# Patient Record
Sex: MALE | Race: BLACK OR AFRICAN AMERICAN | NOT HISPANIC OR LATINO | URBAN - METROPOLITAN AREA
[De-identification: names, ages, dates, MRNs, and addresses within clinical notes are randomized per-mention and may not be internally consistent; named-entity substitution may affect disease eponyms.]

---

## 2024-07-15 ENCOUNTER — INPATIENT (INPATIENT)
Facility: HOSPITAL | Age: 89
LOS: 3 days | Discharge: HOME | DRG: 379 | End: 2024-07-19
Attending: STUDENT IN AN ORGANIZED HEALTH CARE EDUCATION/TRAINING PROGRAM | Admitting: STUDENT IN AN ORGANIZED HEALTH CARE EDUCATION/TRAINING PROGRAM
Payer: MEDICARE

## 2024-07-15 VITALS
SYSTOLIC BLOOD PRESSURE: 111 MMHG | RESPIRATION RATE: 18 BRPM | HEART RATE: 68 BPM | OXYGEN SATURATION: 99 % | WEIGHT: 167.99 LBS | DIASTOLIC BLOOD PRESSURE: 63 MMHG

## 2024-07-15 DIAGNOSIS — K62.5 HEMORRHAGE OF ANUS AND RECTUM: ICD-10-CM

## 2024-07-15 LAB
ALBUMIN SERPL ELPH-MCNC: 3.7 G/DL — SIGNIFICANT CHANGE UP (ref 3.5–5.2)
ALP SERPL-CCNC: 77 U/L — SIGNIFICANT CHANGE UP (ref 30–115)
ALT FLD-CCNC: 10 U/L — SIGNIFICANT CHANGE UP (ref 0–41)
ANION GAP SERPL CALC-SCNC: 16 MMOL/L — HIGH (ref 7–14)
APTT BLD: 41 SEC — HIGH (ref 27–39.2)
APTT BLD: 46 SEC — HIGH (ref 27–39.2)
AST SERPL-CCNC: 18 U/L — SIGNIFICANT CHANGE UP (ref 0–41)
BASE EXCESS BLDV CALC-SCNC: -0.5 MMOL/L — SIGNIFICANT CHANGE UP (ref -2–3)
BASOPHILS # BLD AUTO: 0.03 K/UL — SIGNIFICANT CHANGE UP (ref 0–0.2)
BASOPHILS NFR BLD AUTO: 0.5 % — SIGNIFICANT CHANGE UP (ref 0–1)
BILIRUB SERPL-MCNC: 0.5 MG/DL — SIGNIFICANT CHANGE UP (ref 0.2–1.2)
BLD GP AB SCN SERPL QL: SIGNIFICANT CHANGE UP
BUN SERPL-MCNC: 34 MG/DL — HIGH (ref 10–20)
CA-I SERPL-SCNC: 1.27 MMOL/L — SIGNIFICANT CHANGE UP (ref 1.15–1.33)
CALCIUM SERPL-MCNC: 9.3 MG/DL — SIGNIFICANT CHANGE UP (ref 8.4–10.5)
CHLORIDE SERPL-SCNC: 107 MMOL/L — SIGNIFICANT CHANGE UP (ref 98–110)
CO2 SERPL-SCNC: 20 MMOL/L — SIGNIFICANT CHANGE UP (ref 17–32)
CREAT SERPL-MCNC: 2 MG/DL — HIGH (ref 0.7–1.5)
EGFR: 32 ML/MIN/1.73M2 — LOW
EOSINOPHIL # BLD AUTO: 0.07 K/UL — SIGNIFICANT CHANGE UP (ref 0–0.7)
EOSINOPHIL NFR BLD AUTO: 1.1 % — SIGNIFICANT CHANGE UP (ref 0–8)
FLUAV AG NPH QL: SIGNIFICANT CHANGE UP
FLUBV AG NPH QL: SIGNIFICANT CHANGE UP
GAS PNL BLDV: 141 MMOL/L — SIGNIFICANT CHANGE UP (ref 136–145)
GAS PNL BLDV: SIGNIFICANT CHANGE UP
GLUCOSE SERPL-MCNC: 110 MG/DL — HIGH (ref 70–99)
HCO3 BLDV-SCNC: 25 MMOL/L — SIGNIFICANT CHANGE UP (ref 22–29)
HCT VFR BLD CALC: 28 % — LOW (ref 42–52)
HCT VFR BLD CALC: 29.9 % — LOW (ref 42–52)
HCT VFR BLDA CALC: 35 % — LOW (ref 39–51)
HGB BLD CALC-MCNC: 11.8 G/DL — LOW (ref 12.6–17.4)
HGB BLD-MCNC: 10 G/DL — LOW (ref 14–18)
HGB BLD-MCNC: 9.3 G/DL — LOW (ref 14–18)
IMM GRANULOCYTES NFR BLD AUTO: 0.8 % — HIGH (ref 0.1–0.3)
INR BLD: 1.98 RATIO — HIGH (ref 0.65–1.3)
INR BLD: 2.11 RATIO — HIGH (ref 0.65–1.3)
LACTATE BLDV-MCNC: 1.8 MMOL/L — SIGNIFICANT CHANGE UP (ref 0.5–2)
LACTATE SERPL-SCNC: 1.7 MMOL/L — SIGNIFICANT CHANGE UP (ref 0.7–2)
LYMPHOCYTES # BLD AUTO: 2.15 K/UL — SIGNIFICANT CHANGE UP (ref 1.2–3.4)
LYMPHOCYTES # BLD AUTO: 33.3 % — SIGNIFICANT CHANGE UP (ref 20.5–51.1)
MCHC RBC-ENTMCNC: 27.7 PG — SIGNIFICANT CHANGE UP (ref 27–31)
MCHC RBC-ENTMCNC: 27.8 PG — SIGNIFICANT CHANGE UP (ref 27–31)
MCHC RBC-ENTMCNC: 33.2 G/DL — SIGNIFICANT CHANGE UP (ref 32–37)
MCHC RBC-ENTMCNC: 33.4 G/DL — SIGNIFICANT CHANGE UP (ref 32–37)
MCV RBC AUTO: 82.8 FL — SIGNIFICANT CHANGE UP (ref 80–94)
MCV RBC AUTO: 83.6 FL — SIGNIFICANT CHANGE UP (ref 80–94)
MONOCYTES # BLD AUTO: 0.49 K/UL — SIGNIFICANT CHANGE UP (ref 0.1–0.6)
MONOCYTES NFR BLD AUTO: 7.6 % — SIGNIFICANT CHANGE UP (ref 1.7–9.3)
NEUTROPHILS # BLD AUTO: 3.66 K/UL — SIGNIFICANT CHANGE UP (ref 1.4–6.5)
NEUTROPHILS NFR BLD AUTO: 56.7 % — SIGNIFICANT CHANGE UP (ref 42.2–75.2)
NRBC # BLD: 0 /100 WBCS — SIGNIFICANT CHANGE UP (ref 0–0)
NRBC # BLD: 0 /100 WBCS — SIGNIFICANT CHANGE UP (ref 0–0)
PCO2 BLDV: 43 MMHG — SIGNIFICANT CHANGE UP (ref 42–55)
PH BLDV: 7.37 — SIGNIFICANT CHANGE UP (ref 7.32–7.43)
PLATELET # BLD AUTO: 177 K/UL — SIGNIFICANT CHANGE UP (ref 130–400)
PLATELET # BLD AUTO: 229 K/UL — SIGNIFICANT CHANGE UP (ref 130–400)
PMV BLD: 10.6 FL — HIGH (ref 7.4–10.4)
PMV BLD: 10.7 FL — HIGH (ref 7.4–10.4)
PO2 BLDV: 12 MMHG — LOW (ref 25–45)
POTASSIUM BLDV-SCNC: 5.3 MMOL/L — HIGH (ref 3.5–5.1)
POTASSIUM SERPL-MCNC: 5.2 MMOL/L — HIGH (ref 3.5–5)
POTASSIUM SERPL-SCNC: 5.2 MMOL/L — HIGH (ref 3.5–5)
PROT SERPL-MCNC: 7.2 G/DL — SIGNIFICANT CHANGE UP (ref 6–8)
PROTHROM AB SERPL-ACNC: 22.7 SEC — HIGH (ref 9.95–12.87)
PROTHROM AB SERPL-ACNC: 24.2 SEC — HIGH (ref 9.95–12.87)
RBC # BLD: 3.35 M/UL — LOW (ref 4.7–6.1)
RBC # BLD: 3.61 M/UL — LOW (ref 4.7–6.1)
RBC # FLD: 16.5 % — HIGH (ref 11.5–14.5)
RBC # FLD: 16.9 % — HIGH (ref 11.5–14.5)
RSV RNA NPH QL NAA+NON-PROBE: SIGNIFICANT CHANGE UP
SAO2 % BLDV: 11.9 % — LOW (ref 67–88)
SARS-COV-2 RNA SPEC QL NAA+PROBE: SIGNIFICANT CHANGE UP
SODIUM SERPL-SCNC: 143 MMOL/L — SIGNIFICANT CHANGE UP (ref 135–146)
WBC # BLD: 6.45 K/UL — SIGNIFICANT CHANGE UP (ref 4.8–10.8)
WBC # BLD: 6.81 K/UL — SIGNIFICANT CHANGE UP (ref 4.8–10.8)
WBC # FLD AUTO: 6.45 K/UL — SIGNIFICANT CHANGE UP (ref 4.8–10.8)
WBC # FLD AUTO: 6.81 K/UL — SIGNIFICANT CHANGE UP (ref 4.8–10.8)

## 2024-07-15 PROCEDURE — 99285 EMERGENCY DEPT VISIT HI MDM: CPT

## 2024-07-15 PROCEDURE — 93010 ELECTROCARDIOGRAM REPORT: CPT

## 2024-07-15 PROCEDURE — 85025 COMPLETE CBC W/AUTO DIFF WBC: CPT

## 2024-07-15 PROCEDURE — 80053 COMPREHEN METABOLIC PANEL: CPT

## 2024-07-15 PROCEDURE — 71045 X-RAY EXAM CHEST 1 VIEW: CPT | Mod: 26

## 2024-07-15 PROCEDURE — 83735 ASSAY OF MAGNESIUM: CPT

## 2024-07-15 PROCEDURE — 70450 CT HEAD/BRAIN W/O DYE: CPT | Mod: 26,MC

## 2024-07-15 PROCEDURE — 84145 PROCALCITONIN (PCT): CPT

## 2024-07-15 PROCEDURE — 86901 BLOOD TYPING SEROLOGIC RH(D): CPT

## 2024-07-15 PROCEDURE — 36415 COLL VENOUS BLD VENIPUNCTURE: CPT

## 2024-07-15 PROCEDURE — 85027 COMPLETE CBC AUTOMATED: CPT

## 2024-07-15 PROCEDURE — 97162 PT EVAL MOD COMPLEX 30 MIN: CPT | Mod: GP

## 2024-07-15 PROCEDURE — 84466 ASSAY OF TRANSFERRIN: CPT

## 2024-07-15 PROCEDURE — 83540 ASSAY OF IRON: CPT

## 2024-07-15 PROCEDURE — 74174 CTA ABD&PLVS W/CONTRAST: CPT | Mod: 26,MC

## 2024-07-15 PROCEDURE — 70450 CT HEAD/BRAIN W/O DYE: CPT | Mod: MC

## 2024-07-15 PROCEDURE — 0241U: CPT

## 2024-07-15 PROCEDURE — 86900 BLOOD TYPING SEROLOGIC ABO: CPT

## 2024-07-15 PROCEDURE — 95819 EEG AWAKE AND ASLEEP: CPT

## 2024-07-15 PROCEDURE — 86850 RBC ANTIBODY SCREEN: CPT

## 2024-07-15 PROCEDURE — 80048 BASIC METABOLIC PNL TOTAL CA: CPT

## 2024-07-15 RX ORDER — DEXTROSE MONOHYDRATE AND SODIUM CHLORIDE 5; .3 G/100ML; G/100ML
1000 INJECTION, SOLUTION INTRAVENOUS ONCE
Refills: 0 | Status: COMPLETED | OUTPATIENT
Start: 2024-07-15 | End: 2024-07-15

## 2024-07-15 RX ORDER — CEFEPIME 1 G/1
2000 INJECTION, POWDER, FOR SOLUTION INTRAMUSCULAR; INTRAVENOUS EVERY 8 HOURS
Refills: 0 | Status: DISCONTINUED | OUTPATIENT
Start: 2024-07-16 | End: 2024-07-16

## 2024-07-15 RX ORDER — PANTOPRAZOLE SODIUM 40 MG/10ML
40 INJECTION, POWDER, FOR SOLUTION INTRAVENOUS ONCE
Refills: 0 | Status: COMPLETED | OUTPATIENT
Start: 2024-07-15 | End: 2024-07-15

## 2024-07-15 RX ORDER — CEFEPIME 1 G/1
1000 INJECTION, POWDER, FOR SOLUTION INTRAMUSCULAR; INTRAVENOUS ONCE
Refills: 0 | Status: COMPLETED | OUTPATIENT
Start: 2024-07-15 | End: 2024-07-15

## 2024-07-15 RX ORDER — PANTOPRAZOLE SODIUM 40 MG/10ML
40 INJECTION, POWDER, FOR SOLUTION INTRAVENOUS
Refills: 0 | Status: DISCONTINUED | OUTPATIENT
Start: 2024-07-15 | End: 2024-07-18

## 2024-07-15 RX ORDER — DEXTROSE MONOHYDRATE AND SODIUM CHLORIDE 5; .3 G/100ML; G/100ML
1000 INJECTION, SOLUTION INTRAVENOUS
Refills: 0 | Status: DISCONTINUED | OUTPATIENT
Start: 2024-07-15 | End: 2024-07-17

## 2024-07-15 RX ADMIN — PANTOPRAZOLE SODIUM 40 MILLIGRAM(S): 40 INJECTION, POWDER, FOR SOLUTION INTRAVENOUS at 20:58

## 2024-07-15 RX ADMIN — CEFEPIME 100 MILLIGRAM(S): 1 INJECTION, POWDER, FOR SOLUTION INTRAMUSCULAR; INTRAVENOUS at 22:38

## 2024-07-15 RX ADMIN — DEXTROSE MONOHYDRATE AND SODIUM CHLORIDE 1000 MILLILITER(S): 5; .3 INJECTION, SOLUTION INTRAVENOUS at 17:43

## 2024-07-15 NOTE — ED PROVIDER NOTE - PHYSICAL EXAMINATION
VITAL SIGNS: I have reviewed nursing notes and confirm.  CONSTITUTIONAL: Non-toxic, in NAD  SKIN: Cool and dry, normal skin turgor  HEAD: NCAT  EYES: No conjunctival injection, scleral anicteric  ENT: Moist mucous membranes, normal pharynx with no erythema or exudates  NECK: Supple; full ROM. Nontender. No cervical LAD  CARD: RRR, no murmurs, rubs or gallops  RESP: Clear to ausculation bilaterally.  No rales, rhonchi, or wheezing.  ABD: Soft, non-distended, non-tender   EXT: Full ROM, no bony tenderness, no pedal edema, no calf tenderness  NEURO: Normal motor, normal sensory.   PSYCH: Cooperative, appropriate.

## 2024-07-15 NOTE — ED ADULT NURSE NOTE - NSFALLHARMRISKINTERV_ED_ALL_ED
Assistance OOB with selected safe patient handling equipment if applicable/Assistance with ambulation/Communicate risk of Fall with Harm to all staff, patient, and family/Monitor gait and stability/Provide visual cue: red socks, yellow wristband, yellow gown, etc/Reinforce activity limits and safety measures with patient and family/Bed in lowest position, wheels locked, appropriate side rails in place/Call bell, personal items and telephone in reach/Instruct patient to call for assistance before getting out of bed/chair/stretcher/Non-slip footwear applied when patient is off stretcher/[place] to call system/Physically safe environment - no spills, clutter or unnecessary equipment/Purposeful Proactive Rounding/Room/bathroom lighting operational, light cord in reach

## 2024-07-15 NOTE — ED PROVIDER NOTE - CARE PLAN
1 Principal Discharge DX:	Hypotension  Secondary Diagnosis:	Rectal bleed   Principal Discharge DX:	Rectal bleeding   Principal Discharge DX:	Rectal bleeding  Secondary Diagnosis:	EDUARDO (acute kidney injury)  Secondary Diagnosis:	Opacity of lung on imaging study  Secondary Diagnosis:	Hilar mass

## 2024-07-15 NOTE — ED ADULT NURSE NOTE - ISOLATION TYPE:
Pt was taking 50 mg morning and night. She was feeling much better on the increased meds. When on one daily she cries more and is much more angry. They will try 100mg at night. Daughter will call if this does not work. Patient has become much more forgetful. Can be verbally abusive to daughter and . Ed daughter on behavioral interventions for dementia. They have people in the house every night. None

## 2024-07-15 NOTE — ED ADULT TRIAGE NOTE - CHIEF COMPLAINT QUOTE
Pt c/o hypotension from [facility], systolic 80s as per EMS at NH. Pt at baseline mental status as per EMS (aox1). VSS in triage. Unable to get temperature in triage.

## 2024-07-15 NOTE — H&P ADULT - HISTORY OF PRESENT ILLNESS
Patient is an 89 year old male with PMH of AMS, afib on eliquis +amiodarone MM, Prostate CA, HTN, and recent admission in [place] for an ulcer presenting for hypotension, Patient was BIBEMS from [facility] for reports of hypotension and lethargy/AMS. Patient denies reports of chest pain, shortness of breath, nausea, vomiting, abdominal pain, diarrhea, constipation, or additional complaints.     In ED, vitals were stable. Found to have BRBPR on ROMULO. CT A/P w/ fndings concerning for pna, CTH w/ acute pathology.     GI was consulted. Recommended NPO w. ppi BID. Patient received IV cefepime/levaquin, LR bolus x1 and protonix IV 40mg x1 in ED. He is being admitted for LGIB and pna.

## 2024-07-15 NOTE — ED PROVIDER NOTE - PROGRESS NOTE DETAILS
SO: Daughter presented bedside and states patient is new to [facility] as of last week. He was recently discharged from [facility] for an ulcer. Denied any reports of rectal bleeding, fevers, or falls since patient arrived to nursing home. FF: according to carmen HORN pt creatinine on 7/11/2025 as 1.6 FF: according to carmen HORN pt creatinine on 7/11/2025 as 1.6. gi consulted recommend ppi iv bid, npo, and they will follow.

## 2024-07-15 NOTE — ED ADULT NURSE NOTE - NSFALLDEVICES_ED_ALL_ED
Hypertension is improving with treatment.  Continue current treatment regimen.  Blood pressure will be reassessed in 3 months.   None

## 2024-07-15 NOTE — H&P ADULT - ASSESSMENT
Patient is an 89 year old male with PMH of AMS, afib on eliquis +amiodarone MM, Prostate CA, HTN, and recent admission in [place] for an ulcer presenting for hypotension, He is being admitted for management of LGIB w/ pna.     Patient is an 89 year old male with PMH of AMS, afib on eliquis +amiodarone MM, Prostate CA, HTN, and recent admission in [place] for an ulcer presenting for hypotension, He is being admitted for management of LGIB w/ pna.    #LGIB  hematochezia, BRBPR noted on ROMULO  no hemmorhoids/fissures noted on exam, like diverticular etiology vs. Ca?  - Hb  currently 10, but can lag, vitally stable  - t&s resulted  - cbc q12 for now, cmp/mg, iron studies  - keep NPO, supp O2 prn, 2+PIV 18G  - will IV hydrate LR  - GI recs appr: NPO, PPI 40mg  BID, will await word on whether they will scope, colo vs sigmo (will need bowel prep if yes)  - GI following    #PNA?  #Proctitis  small right basilar pleural effusion (parapneumonic?) w/ consolidative opacities noted on CT AP  also w/ proctitis (may be 2/2 acute LGIB)  s/p cefepime/levaquin in ED  - vitally stable, but will cover ppx for now  - given recent NH admission, and hx of recent hospitalizations, kin c/w cefepime for now  - f/u RVP, sputum cx/gram stain, procal, urine strep/legionella, MRSA swab, procal  - tylenol prn    #afib on Eliquis 5mg bid + amiodarone 400mg qd  - will hold Eliquis pending GI recs. no need for reversal at this time  - c/w amiodarone once cleared for PO    #MM  #Prostate Cancer  - pt follows w/ Dr. Marie  - CT AP: L5 vertebral body sclerotic lesion concerning for metastatic disease.  - o/p f/u    #Dementia  #Hx of CVA  - AAOx1 at baseline  - per NH reports, patient takes:  mirtazapine 15mg half a tab qhs  depakote 125mg 2 capsules q8  keppra 750mg qd  - hold pending GI recs  - restart when able    #BPH  - flomax 0.4mg qhs      # Misc  - GI ppx: protonix BID  - DVT ppx: can hold for now given most recent coag levels, f/u w/ GI for plan and restart accordingly  - Diet: NPO  - Activity: bedrest  - DISPO:  admit    CODE STATUS:  FULL CODE (Discussed w/ daughter at bedside

## 2024-07-15 NOTE — ED ADULT NURSE NOTE - OBJECTIVE STATEMENT
c/o hypotension from [facility], systolic 80s as per EMS at NH. Pt at baseline mental status as per EMS (aox1). VSS in triage. Unable to get temperature in triage.

## 2024-07-15 NOTE — ED PROVIDER NOTE - CLINICAL SUMMARY MEDICAL DECISION MAKING FREE TEXT BOX
Patient presents with episode of hypotension at the nursing home.  Labs and imaging.  Found to have rectal bleeding in the ED.  CT angio added.  Also found to have pneumonia.  Antibiotics given.  GI consulted.  Patient admitted for further management.  Hemodynamically stable in the ED. Labs  were ordered and reviewed.  Imaging was ordered and reviewed by me.  Appropriate medications for patient's presenting complaints were ordered and effects were reassessed.  Patient's records (prior hospital, ED visit, and/or nursing home notes if available) were reviewed.  Additional history was obtained from EMS, family, and/or PCP (where available).  Escalation to admission/observation was considered.  Patient requires inpatient hospitalization - monitored setting.

## 2024-07-15 NOTE — ED PROVIDER NOTE - OBJECTIVE STATEMENT
Patient is an 89 year old male with PMH of MM, Prostate CA, HTN, and recent admission in [place] for an ulcer presenting for hypotension, Patient was BIBEMS from [facility] for reports of hypotension and lethargy/AMS. Patient denies reports of chest pain, shortness of breath, nausea, vomiting, abdominal pain, diarrhea, constipation, or additional complaints.

## 2024-07-15 NOTE — ED ADULT NURSE NOTE - CHIEF COMPLAINT QUOTE
Pt c/o hypotension from [facility], systolic 80s as per EMS at NH. Pt at baseline mental status as per EMS (aox1). VSS in triage. Unable to get temperature in triage.

## 2024-07-15 NOTE — ED PROVIDER NOTE - ATTENDING CONTRIBUTION TO CARE
89-year-old male past med history hypertension, CVA, hyperlipidemia, A-fib on Eliquis, BPH, dementia presents with episode of hypotension at nursing home.  Patient is a poor historian unable to state why he is here.  A&O x 1 at this time.  Hemodynamically stable in the ED.  As per nursing home paperwork reports episode of hypotension to the 80s at the facility.  Patient is not febrile in the ED.    CONSTITUTIONAL: Well-developed; well-nourished; in no acute distress.   SKIN: warm, dry  HEAD: Normocephalic; atraumatic.  EYES: PERRL, EOMI, no conjunctival erythema  ENT: No nasal discharge; airway clear.  NECK: Supple; non tender.  CARD: S1, S2 normal;  Regular rate and rhythm.   RESP: No wheezes, rales or rhonchi.  ABD: soft non tender, non distended, no rebound or guarding. + red blood on rectal exam.   EXT: Normal ROM.  5/5 strength in all 4 extremities   LYMPH: No acute cervical adenopathy.  NEURO:  neurovascularly intact  PSYCH: Cooperative, appropriate.

## 2024-07-16 LAB
ALBUMIN SERPL ELPH-MCNC: 3.2 G/DL — LOW (ref 3.5–5.2)
ALP SERPL-CCNC: 70 U/L — SIGNIFICANT CHANGE UP (ref 30–115)
ALT FLD-CCNC: 9 U/L — SIGNIFICANT CHANGE UP (ref 0–41)
ANION GAP SERPL CALC-SCNC: 13 MMOL/L — SIGNIFICANT CHANGE UP (ref 7–14)
AST SERPL-CCNC: 14 U/L — SIGNIFICANT CHANGE UP (ref 0–41)
BILIRUB SERPL-MCNC: 0.5 MG/DL — SIGNIFICANT CHANGE UP (ref 0.2–1.2)
BUN SERPL-MCNC: 29 MG/DL — HIGH (ref 10–20)
CALCIUM SERPL-MCNC: 8.7 MG/DL — SIGNIFICANT CHANGE UP (ref 8.4–10.5)
CHLORIDE SERPL-SCNC: 108 MMOL/L — SIGNIFICANT CHANGE UP (ref 98–110)
CO2 SERPL-SCNC: 25 MMOL/L — SIGNIFICANT CHANGE UP (ref 17–32)
CREAT SERPL-MCNC: 1.8 MG/DL — HIGH (ref 0.7–1.5)
EGFR: 36 ML/MIN/1.73M2 — LOW
GLUCOSE SERPL-MCNC: 109 MG/DL — HIGH (ref 70–99)
HCT VFR BLD CALC: 27 % — LOW (ref 42–52)
HCT VFR BLD CALC: 29.5 % — LOW (ref 42–52)
HGB BLD-MCNC: 8.9 G/DL — LOW (ref 14–18)
HGB BLD-MCNC: 9.7 G/DL — LOW (ref 14–18)
IRON SATN MFR SERPL: 69 UG/DL — SIGNIFICANT CHANGE UP (ref 35–150)
MAGNESIUM SERPL-MCNC: 2.8 MG/DL — HIGH (ref 1.8–2.4)
MCHC RBC-ENTMCNC: 27.1 PG — SIGNIFICANT CHANGE UP (ref 27–31)
MCHC RBC-ENTMCNC: 27.2 PG — SIGNIFICANT CHANGE UP (ref 27–31)
MCHC RBC-ENTMCNC: 32.9 G/DL — SIGNIFICANT CHANGE UP (ref 32–37)
MCHC RBC-ENTMCNC: 33 G/DL — SIGNIFICANT CHANGE UP (ref 32–37)
MCV RBC AUTO: 82.3 FL — SIGNIFICANT CHANGE UP (ref 80–94)
MCV RBC AUTO: 82.9 FL — SIGNIFICANT CHANGE UP (ref 80–94)
NRBC # BLD: 0 /100 WBCS — SIGNIFICANT CHANGE UP (ref 0–0)
NRBC # BLD: 0 /100 WBCS — SIGNIFICANT CHANGE UP (ref 0–0)
PLATELET # BLD AUTO: 176 K/UL — SIGNIFICANT CHANGE UP (ref 130–400)
PLATELET # BLD AUTO: 217 K/UL — SIGNIFICANT CHANGE UP (ref 130–400)
PMV BLD: 10.5 FL — HIGH (ref 7.4–10.4)
PMV BLD: 10.9 FL — HIGH (ref 7.4–10.4)
POTASSIUM SERPL-MCNC: 4.5 MMOL/L — SIGNIFICANT CHANGE UP (ref 3.5–5)
POTASSIUM SERPL-SCNC: 4.5 MMOL/L — SIGNIFICANT CHANGE UP (ref 3.5–5)
PROCALCITONIN SERPL-MCNC: 0.3 NG/ML — HIGH (ref 0.02–0.1)
PROT SERPL-MCNC: 6.4 G/DL — SIGNIFICANT CHANGE UP (ref 6–8)
RBC # BLD: 3.28 M/UL — LOW (ref 4.7–6.1)
RBC # BLD: 3.56 M/UL — LOW (ref 4.7–6.1)
RBC # FLD: 16.6 % — HIGH (ref 11.5–14.5)
RBC # FLD: 16.8 % — HIGH (ref 11.5–14.5)
SODIUM SERPL-SCNC: 146 MMOL/L — SIGNIFICANT CHANGE UP (ref 135–146)
TRANSFERRIN SERPL-MCNC: 121 MG/DL — LOW (ref 200–360)
WBC # BLD: 5.6 K/UL — SIGNIFICANT CHANGE UP (ref 4.8–10.8)
WBC # BLD: 6.51 K/UL — SIGNIFICANT CHANGE UP (ref 4.8–10.8)
WBC # FLD AUTO: 5.6 K/UL — SIGNIFICANT CHANGE UP (ref 4.8–10.8)
WBC # FLD AUTO: 6.51 K/UL — SIGNIFICANT CHANGE UP (ref 4.8–10.8)

## 2024-07-16 PROCEDURE — 99222 1ST HOSP IP/OBS MODERATE 55: CPT

## 2024-07-16 PROCEDURE — 70450 CT HEAD/BRAIN W/O DYE: CPT | Mod: 26

## 2024-07-16 PROCEDURE — 99223 1ST HOSP IP/OBS HIGH 75: CPT

## 2024-07-16 RX ORDER — AMIODARONE HYDROCHLORIDE 50 MG/ML
1 INJECTION, SOLUTION INTRAVENOUS
Refills: 0 | DISCHARGE

## 2024-07-16 RX ORDER — LEVETIRACETAM 100 MG/ML
750 INJECTION INTRAVENOUS ONCE
Refills: 0 | Status: DISCONTINUED | OUTPATIENT
Start: 2024-07-16 | End: 2024-07-16

## 2024-07-16 RX ORDER — TAMSULOSIN HYDROCHLORIDE 0.4 MG/1
0.4 CAPSULE ORAL AT BEDTIME
Refills: 0 | Status: DISCONTINUED | OUTPATIENT
Start: 2024-07-16 | End: 2024-07-19

## 2024-07-16 RX ORDER — APIXABAN 5 MG/1
2.5 TABLET, FILM COATED ORAL EVERY 12 HOURS
Refills: 0 | Status: DISCONTINUED | OUTPATIENT
Start: 2024-07-16 | End: 2024-07-16

## 2024-07-16 RX ORDER — MESALAMINE 500 MG/1
1000 CAPSULE ORAL AT BEDTIME
Refills: 0 | Status: DISCONTINUED | OUTPATIENT
Start: 2024-07-16 | End: 2024-07-19

## 2024-07-16 RX ORDER — MIRTAZAPINE 15 MG/1
7.5 TABLET, FILM COATED ORAL AT BEDTIME
Refills: 0 | Status: DISCONTINUED | OUTPATIENT
Start: 2024-07-16 | End: 2024-07-19

## 2024-07-16 RX ORDER — VALPROIC ACID 250 MG/5ML
250 SOLUTION ORAL THREE TIMES A DAY
Refills: 0 | Status: DISCONTINUED | OUTPATIENT
Start: 2024-07-16 | End: 2024-07-17

## 2024-07-16 RX ORDER — TAMSULOSIN HYDROCHLORIDE 0.4 MG/1
1 CAPSULE ORAL
Refills: 0 | DISCHARGE

## 2024-07-16 RX ORDER — DIVALPROEX SODIUM 500 MG
250 TABLET, DELAYED RELEASE (ENTERIC COATED) ORAL EVERY 8 HOURS
Refills: 0 | Status: DISCONTINUED | OUTPATIENT
Start: 2024-07-16 | End: 2024-07-16

## 2024-07-16 RX ORDER — CALCITRIOL 0.25 UG/1
1 CAPSULE, LIQUID FILLED ORAL
Refills: 0 | DISCHARGE

## 2024-07-16 RX ORDER — CALCITRIOL 0.25 UG/1
0.25 CAPSULE, LIQUID FILLED ORAL DAILY
Refills: 0 | Status: DISCONTINUED | OUTPATIENT
Start: 2024-07-16 | End: 2024-07-19

## 2024-07-16 RX ORDER — CEFEPIME 1 G/1
2000 INJECTION, POWDER, FOR SOLUTION INTRAMUSCULAR; INTRAVENOUS EVERY 24 HOURS
Refills: 0 | Status: DISCONTINUED | OUTPATIENT
Start: 2024-07-16 | End: 2024-07-16

## 2024-07-16 RX ORDER — AMIODARONE HYDROCHLORIDE 50 MG/ML
400 INJECTION, SOLUTION INTRAVENOUS DAILY
Refills: 0 | Status: DISCONTINUED | OUTPATIENT
Start: 2024-07-16 | End: 2024-07-19

## 2024-07-16 RX ORDER — APIXABAN 5 MG/1
1 TABLET, FILM COATED ORAL
Refills: 0 | DISCHARGE

## 2024-07-16 RX ORDER — FOLIC ACID
1 POWDER (GRAM) MISCELLANEOUS DAILY
Refills: 0 | Status: DISCONTINUED | OUTPATIENT
Start: 2024-07-16 | End: 2024-07-19

## 2024-07-16 RX ORDER — APIXABAN 5 MG/1
2.5 TABLET, FILM COATED ORAL EVERY 12 HOURS
Refills: 0 | Status: DISCONTINUED | OUTPATIENT
Start: 2024-07-17 | End: 2024-07-19

## 2024-07-16 RX ORDER — FOLIC ACID
1 POWDER (GRAM) MISCELLANEOUS
Refills: 0 | DISCHARGE

## 2024-07-16 RX ORDER — MIRTAZAPINE 15 MG/1
1 TABLET, FILM COATED ORAL
Refills: 0 | DISCHARGE

## 2024-07-16 RX ORDER — LEVETIRACETAM 100 MG/ML
750 INJECTION INTRAVENOUS DAILY
Refills: 0 | Status: DISCONTINUED | OUTPATIENT
Start: 2024-07-16 | End: 2024-07-17

## 2024-07-16 RX ORDER — LEVETIRACETAM 100 MG/ML
1 INJECTION INTRAVENOUS
Refills: 0 | DISCHARGE

## 2024-07-16 RX ORDER — DIVALPROEX SODIUM 500 MG
1 TABLET, DELAYED RELEASE (ENTERIC COATED) ORAL
Refills: 0 | DISCHARGE

## 2024-07-16 RX ADMIN — PANTOPRAZOLE SODIUM 40 MILLIGRAM(S): 40 INJECTION, POWDER, FOR SOLUTION INTRAVENOUS at 18:19

## 2024-07-16 RX ADMIN — LEVETIRACETAM 750 MILLIGRAM(S): 100 INJECTION INTRAVENOUS at 18:20

## 2024-07-16 RX ADMIN — VALPROIC ACID 52.5 MILLIGRAM(S): 250 SOLUTION ORAL at 22:23

## 2024-07-16 RX ADMIN — TAMSULOSIN HYDROCHLORIDE 0.4 MILLIGRAM(S): 0.4 CAPSULE ORAL at 21:23

## 2024-07-16 RX ADMIN — MIRTAZAPINE 7.5 MILLIGRAM(S): 15 TABLET, FILM COATED ORAL at 21:23

## 2024-07-16 NOTE — CONSULT NOTE ADULT - SUBJECTIVE AND OBJECTIVE BOX
Gastroenterology Consultation:    Patient is a 89y old  Male who presents with a chief complaint of       Admitted on: 07-15-24      HPI:  Patient is an 89 year old male with PMH of AMS, afib on eliquis +amiodarone MM, Prostate CA, HTN, and recent admission in [place] for an ulcer presenting for hypotension, Patient was BIBEMS from [facility] for reports of hypotension and lethargy/AMS. Patient denies reports of chest pain, shortness of breath, nausea, vomiting, abdominal pain, diarrhea, constipation, or additional complaints.     In ED, vitals were stable. Found to have BRBPR on ROMULO. CT A/P w/ fndings concerning for pna, CTH w/ acute pathology.     GI was consulted. Recommended NPO w. ppi BID. Patient received IV cefepime/levaquin, LR bolus x1 and protonix IV 40mg x1 in ED. He is being admitted for LGIB and pna. (15 Jul 2024 22:53)        Prior EGD: not in system     Prior Colonoscopy: not insystem       PAST MEDICAL & SURGICAL HISTORY:  Multiple myeloma      Prostate cancer      No significant past surgical history            FAMILY HISTORY:  No pertinent family history in first degree relatives    Social Hx: unable to obtain. Pt AOX1     Home Medications:  amiodarone 400 mg oral tablet: 1 tab(s) orally once a day (16 Jul 2024 17:27)  calcitriol 0.25 mcg oral capsule: 1 cap(s) orally once a day (16 Jul 2024 17:25)  divalproex sodium 250 mg oral delayed release tablet: 1 tab(s) orally 3 times a day (16 Jul 2024 17:28)  Eliquis 5 mg oral tablet: 1 tab(s) orally 2 times a day (16 Jul 2024 17:27)  folic acid 1 mg oral tablet: 1 tab(s) orally once a day (16 Jul 2024 17:25)  levETIRAcetam 750 mg oral tablet: 1 tab(s) orally once a day (16 Jul 2024 17:30)  mirtazapine 7.5 mg oral tablet: 1 tab(s) orally once a day (at bedtime) (16 Jul 2024 17:26)  tamsulosin 0.4 mg oral capsule: 1 cap(s) orally once a day (16 Jul 2024 17:30)        MEDICATIONS  (STANDING):  aMIOdarone    Tablet 400 milliGRAM(s) Oral daily  calcitriol   Capsule 0.25 MICROGram(s) Oral daily  folic acid 1 milliGRAM(s) Oral daily  lactated ringers. 1000 milliLiter(s) (100 mL/Hr) IV Continuous <Continuous>  levETIRAcetam   Injectable 750 milliGRAM(s) IV Push daily  mirtazapine 7.5 milliGRAM(s) Oral at bedtime  pantoprazole  Injectable 40 milliGRAM(s) IV Push two times a day  tamsulosin 0.4 milliGRAM(s) Oral at bedtime  valproate sodium   IVPB 250 milliGRAM(s) IV Intermittent three times a day    MEDICATIONS  (PRN):      Allergies  No Known Allergies      Review of Systems:   Unable to obtain pt AOX1           Physical Examination:  T(C): 36.8 (07-16-24 @ 12:15), Max: 36.8 (07-16-24 @ 12:15)  HR: 74 (07-16-24 @ 12:15) (71 - 86)  BP: 134/77 (07-16-24 @ 12:15) (90/54 - 134/77)  RR: 20 (07-16-24 @ 12:15) (18 - 20)  SpO2: 95% (07-16-24 @ 08:50) (92% - 98%)      07-15-24 @ 07:01  -  07-16-24 @ 07:00  --------------------------------------------------------  IN: 0 mL / OUT: 250 mL / NET: -250 mL          GENERAL: AAOx1  NECK: Supple, no JVD or thyromegaly  CHEST/LUNG: Clear to auscultation bilaterally; No wheeze, rhonchi, or rales  HEART: Regular rate and rhythm; normal S1, S2, No murmurs.  ABDOMEN: Soft, nontender, nondistended; Bowel sounds present          Data:                        8.9    5.60  )-----------( 176      ( 16 Jul 2024 17:05 )             27.0     Hgb Trend:  8.9  07-16-24 @ 17:05  9.7  07-16-24 @ 07:09  9.3  07-15-24 @ 23:35  10.0  07-15-24 @ 17:10        07-16    146  |  108  |  29<H>  ----------------------------<  109<H>  4.5   |  25  |  1.8<H>    Ca    8.7      16 Jul 2024 07:09  Mg     2.8     07-16    TPro  6.4  /  Alb  3.2<L>  /  TBili  0.5  /  DBili  x   /  AST  14  /  ALT  9   /  AlkPhos  70  07-16    Liver panel trend:  TBili 0.5   /   AST 14   /   ALT 9   /   AlkP 70   /   Tptn 6.4   /   Alb 3.2    /   DBili --      07-16  TBili 0.5   /   AST 18   /   ALT 10   /   AlkP 77   /   Tptn 7.2   /   Alb 3.7    /   DBili --      07-15      PT/INR - ( 15 Jul 2024 19:00 )   PT: 24.20 sec;   INR: 2.11 ratio         PTT - ( 15 Jul 2024 19:00 )  PTT:41.0 sec        Radiology:  CT Angio Abdomen and Pelvis w/ IV Cont:   ACC: [id number] EXAM:  CT ANGIO ABD PELV (W)AW IC   ORDERED BY: MONTANA MALONE     PROCEDURE DATE:  07/15/2024          INTERPRETATION:  CLINICAL HISTORY: Rectal bleeding, hypotension, history   of prostate cancer.    TECHNIQUE: Multislice helicalsections were obtained from the domes of   the diaphragm to the pubic symphysis prior to and following intravenous   administration of Omnipaque 350 utilizing CT angiogram protocol. 95 cc   administered. Coronal and sagittal reformatted images and 3-D/MIPS   reconstruction is performed.    COMPARISON: None    FINDINGS:    LOWER CHEST: Right hilar hypodensity seen adjacent to the right lower   lobar pulmonary artery (7-21) with suggestion of compression of the   adjacent vasculature, concerning for mass. Small right basilar pleural   effusion and consolidative opacities/nodularity. Left basilar ground   glass opacities.    VASCULATURE: Scattered aortic calcifications. Proximal aortic branch   vasculature is patent. No evidence of dissection orintramural hematoma.    HEPATOBILIARY:  Cholelithiasis. Gallbladder wall edema, nonspecific. No   biliary ductal dilatation.    SPLEEN: Unremarkable.    PANCREAS: Unremarkable.    ADRENAL GLANDS: Nodular thickening of the left adrenal gland. Right   adrenal gland is unremarkable.    KIDNEYS: Symmetrically enhancing. No hydronephrosis. Right renal cysts   and subcentimeter hypodensities too small to characterize.    ABDOMINOPELVIC NODES: Unremarkable.    PELVIC ORGANS: Intraluminal urinary bladderair. Prostatic brachytherapy   beads.    PERITONEUM /MESENTERY/BOWEL: Post surgical changes of the bowel. Mural   thickening and hyperenhancement of the rectum. No encapsulated fluid   collection. No bowel obstruction, pneumoperitoneum, or ascites. Colonic   diverticulosis. No evidence of active GI bleed. Normal appendix.    BONES/SOFT TISSUES:Degenerative changes of the spine. L5 vertebral body   sclerotic lesion.    IMPRESSION:    Right hilar hypodensity adjacent to the right lower lobar pulmonary   artery with suggestion of local mass effect on adjacent vasculature,   concerning for mass. Small right basilar pleural effusion with   consolidative opacities/nodularity.    L5 vertebral body sclerotic lesion concerning for metastatic disease.    Rectal mural thickening and hyperenhancement consistent with proctitis.   No abscess.    Intraluminal urinary bladder air which may reflect instrumentation versus   infection.    No evidence of active GI bleed.    A callback was requested at 10:06 PM on 7/17/2024. Dr Matias spoke with   Dr. Streeter at 10:15 PM 7/15/2024 with readback    --- End of Report ---          HUGO MATIAS MD; Resident Radiologist  This document has been electronically signed.  LEONARDA LOZADA MD; Attending Interventional Radiologist  This document has been electronically signed. Jul 15 2024 10:18PM (07-15-24 @ 20:28)

## 2024-07-16 NOTE — PATIENT PROFILE ADULT - FALL HARM RISK - HARM RISK INTERVENTIONS
Assistance with ambulation/Assistance OOB with selected safe patient handling equipment/Communicate Risk of Fall with Harm to all staff/Discuss with provider need for PT consult/Monitor for mental status changes/Monitor gait and stability/Move patient closer to nurses' station/Orthostatic vital signs/Reinforce activity limits and safety measures with patient and family/Reorient to person, place and time as needed/Sit up slowly, dangle for a short time, stand at bedside before walking/Tailored Fall Risk Interventions/Toileting schedule using arm’s reach rule for commode and bathroom/Use of alarms - bed, chair and/or voice tab/Visual Cue: Yellow wristband and red socks/Bed in lowest position, wheels locked, appropriate side rails in place/Call bell, personal items and telephone in reach/Instruct patient to call for assistance before getting out of bed or chair/Non-slip footwear when patient is out of bed/[place] to call system/Physically safe environment - no spills, clutter or unnecessary equipment/Purposeful Proactive Rounding/Room/bathroom lighting operational, light cord in reach

## 2024-07-16 NOTE — CONSULT NOTE ADULT - ASSESSMENT
WBC  Hgb trend   CT A/P w/ IVC:   ROMULO    88 yo male pt with PMHx of Gastric Ca sp gastrectomy, neli hx of C diff infection, MM not in remission, prostate Ca, CKD, prediabetes, Afib on eliquis presenting for hypotension at the NH found to have Bright red blood per rectum in the ED.   # Hematochezia   # Hx of Gastric Cancer s/p gastrectomy  # Recent Hx of C diff x2 latest in June and May  - as per daughter at bedside, pt has been having occasional right blood per rectum   - HD stable  - no BMs today as per nursing staff   - Hgb trend 10 (7/15) < 9.3 (7/15) < 7/16 (7/16)   - WBC normal   - CT A/P w/ IVC: Rectal mural thickening and hyperenhancement consistent with proctitis. No abscess.  - ROMULO in ED +ve for BRBPR. ROMULO deferred today. Spoke to daughter at bedside and decided on holding off ROMULO for now to acoid agitation   - on eliquis for afib currently on hold as per primary team     Recs  - trend CBC  - keep active T&S   - monitor BMs   - ESR/CRP  - GI PCR and C diff   - can start short acting AC while inpt and switch to eliquis if Hgb stable   - GOC with pt's daughter  88 yo male pt with PMHx of Gastric Ca sp gastrectomy, neli hx of C diff infection, MM not in remission, prostate Ca s/p radiation, CKD, prediabetes, Afib on eliquis presenting for hypotension at the NH found to have Bright red blood per rectum in the ED.     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 No abscess.  - ROMULO in ED +ve for BRBPR. ROMULO deferred today. Spoke to daughter at bedside and decided on holding off ROMULO for now to acoid agitation   - on eliquis for afib currently on hold as per primary team     Recs  - can resume eliquis from GI standpoint   - trend CBC  - keep active T&S   - pt will unlikely benefit from inpatient colonoscopy   - GOC with pt's daughter  88 yo male pt with PMHx of Gastric Ca sp gastrectomy, neli hx of C diff infection, MM not in remission, prostate Ca s/p radiation, CKD, prediabetes, Afib on eliquis presenting for hypotension at the NH found to have Bright red blood per rectum in the ED.     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 No abscess.  - ROMULO in ED +ve for BRBPR. ROMULO deferred today. 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 No abscess.  - ROMULO in ED +ve for BRBPR. ROMULO deferred today. 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

## 2024-07-16 NOTE — PROGRESS NOTE ADULT - ASSESSMENT
Patient is an 89 year old male with PMH of AMS, afib on eliquis +amiodarone MM, Prostate CA, HTN, and recent admission in [place] for an ulcer presenting for hypotension, He is being admitted for management of LGIB w/ pna.    #LGIB  hematochezia, BRBPR noted on ROMULO  no hemmorhoids/fissures noted on exam, like diverticular etiology vs. Ca?  - Hb  currently 10, but can lag, vitally stable  - t&s resulted  - cbc q12 for now, cmp/mg, iron studies  - keep NPO, supp O2 prn, 2+PIV 18G  - will IV hydrate LR  - Pt is NPO, PPI 40mg  BID, will await word on whether they will scope, colo vs sigmo (will need bowel prep if yes)  - GI consulted, F/U cs    #PNA  #Proctitis  -small right basilar pleural effusion (parapneumonic?) w/ consolidative opacities noted on CT AP  -CT showed proctitis (may be 2/2 acute LGIB)  -s/p cefepime/levaquin in ED  - vitally stable, but will cover ppx for now  - given recent NH admission, and hx of recent hospitalizations, kin c/w cefepime for now  - f/u sputum cx/gram stain, procal, urine strep/legionella, MRSA swab, procal  -RVP -ve  - tylenol prn    #EDUARDO, probable pre-renal etiology   -creatinine on admission=2> 1.8 today  -IV hydration    #afib  - on Eliquis 5mg bid + amiodarone 400mg qd  - will hold Eliquis pending GI recs. no need for reversal at this time  - c/w amiodarone once cleared for PO    #MM  #Prostate Cancer  - pt follows w/ Dr. Marie  - CT AP: L5 vertebral body sclerotic lesion concerning for metastatic disease.  - o/p f/u    #Dementia  #Hx of CVA  - AAOx1 at baseline  - per NH reports, patient takes:  mirtazapine 15mg half a tab qhs  depakote 125mg 2 capsules q8  keppra 750mg qd  - hold pending GI recs  - restart when able    #BPH  - flomax 0.4mg qhs    # Misc  - GI ppx: protonix BID  - DVT ppx: can hold for now given most recent coag levels, f/u w/ GI for plan and restart accordingly  - Diet: NPO  - Activity: bedrest  - DISPO:  admit      Plan: F/U GI cs for recs, resume PPI bid

## 2024-07-16 NOTE — CHART NOTE - NSCHARTNOTEFT_GEN_A_CORE
Post Fall Assessment    CHIEF COMPLAINT   Patient is a 89y old  Male with AMS,  who presents with a chief complaint of LGIB with lethargy and increase AMS & lethargy.    EVENT SUMMARY   Notified by Rn for patient s/p  fall. Pt seen and examined at bedside. Pt states did/did not hit head, but not mentally aware (CCOx1). Pt has no complaints at this time & denies any pain/ other sxs. He states he was getting up quickly "to go to a " but does not recall what happened.     MEDICATIONS  (STANDING):  cefepime   IVPB 2000 milliGRAM(s) IV Intermittent every 24 hours  lactated ringers. 1000 milliLiter(s) (100 mL/Hr) IV Continuous <Continuous>  pantoprazole  Injectable 40 milliGRAM(s) IV Push two times a day    Vital Signs Last 24 Hrs  T(C): 36.3 (2024 04:54), Max: 36.7 (15 Jul 2024 19:19)  T(F): 97.4 (2024 04:54), Max: 98 (15 Jul 2024 19:19)  HR: 80 (2024 08:52) (63 - 86)  BP: 90/54 (2024 08:52) (90/54 - 126/69)  BP(mean): 88 (2024 06:59) (88 - 88)  RR: 18 (2024 08:50) (18 - 19)  SpO2: 95% (2024 08:50) (92% - 100%)    Parameters below as of 2024 08:50  Patient On (Oxygen Delivery Method): room air        Physical Exam  General: NAD, resting comfortably in bed  Mental Status: A&O x1  Skin: Warm, dry, no rashes, lesions, ecchymosis, bruises   Head: NCAT  Neuro: Non focal, pupils reacting normally to light, normal UE & LE motor function  Cardiac: S1/S2. No murmurs  Lungs: CTA b/l. No rales, wheezes, rhonchi appreciated b/l.   Abdomen: Soft, +BS, non distended  Extremities: No edema. Full ROM all 4 extremities.       A&P  HPI:  Patient is an 89 year old male with PMH of AMS, afib on eliquis +amiodarone MM, Prostate CA, HTN, and recent admission in [place] for an ulcer presenting for hypotension, Patient was BIBEMS from [facility], [place], operated by [facility] for reports of hypotension and lethargy/AMS. Patient denies reports of chest pain, shortness of breath, nausea, vomiting, abdominal pain, diarrhea, constipation, or additional complaints.     In ED, vitals were stable. Found to have BRBPR on ROMULO. CT A/P w/ fndings concerning for pna, CTH w/ acute pathology.     GI was consulted. Recommended NPO w. ppi BID. Patient received IV cefepime/levaquin, LR bolus x1 and protonix IV 40mg x1 in ED. He is being admitted for LGIB and pna. (15 Jul 2024 22:53)      1) s/p fall  - CT head ordered to r/o acute bleed.   - fall precautions Post Fall Assessment    CHIEF COMPLAINT   Patient is a 89y old  Male with AMS,  who presents with a chief complaint of LGIB with lethargy and increase AMS & lethargy.    EVENT SUMMARY   Notified by Rn for patient s/p  fall. Pt seen and examined at bedside. Pt states did/did not hit head, but not mentally aware (CCOx1). Pt has no complaints at this time & denies any pain/ other sxs. He states he was getting up quickly "to go to a " but does not recall what happened.     MEDICATIONS  (STANDING):  cefepime   IVPB 2000 milliGRAM(s) IV Intermittent every 24 hours  lactated ringers. 1000 milliLiter(s) (100 mL/Hr) IV Continuous <Continuous>  pantoprazole  Injectable 40 milliGRAM(s) IV Push two times a day    Vital Signs Last 24 Hrs  T(C): 36.3 (2024 04:54), Max: 36.7 (15 Jul 2024 19:19)  T(F): 97.4 (2024 04:54), Max: 98 (15 Jul 2024 19:19)  HR: 80 (2024 08:52) (63 - 86)  BP: 90/54 (2024 08:52) (90/54 - 126/69)  BP(mean): 88 (2024 06:59) (88 - 88)  RR: 18 (2024 08:50) (18 - 19)  SpO2: 95% (2024 08:50) (92% - 100%)    Parameters below as of 2024 08:50  Patient On (Oxygen Delivery Method): room air        Physical Exam  General: NAD, resting comfortably in bed  Mental Status: A&O x1  Skin: Warm, dry, no rashes, lesions, ecchymosis, bruises   Head: NCAT  Neuro: Non focal, pupils reacting normally to light, normal UE & LE motor function  Cardiac: S1/S2. No murmurs  Lungs: CTA b/l. No rales, wheezes, rhonchi appreciated b/l.   Abdomen: Soft, +BS, non distended  Extremities: No edema. Full ROM all 4 extremities.       A&P  HPI:  Patient is an 89 year old male with PMH of AMS, afib on eliquis +amiodarone MM, Prostate CA, HTN, and recent admission in [place] for an ulcer presenting for hypotension, Patient was BIBEMS from [facility] for reports of hypotension and lethargy/AMS. Patient denies reports of chest pain, shortness of breath, nausea, vomiting, abdominal pain, diarrhea, constipation, or additional complaints.     In ED, vitals were stable. Found to have BRBPR on ROMULO. CT A/P w/ fndings concerning for pna, CTH w/ acute pathology.     GI was consulted. Recommended NPO w. ppi BID. Patient received IV cefepime/levaquin, LR bolus x1 and protonix IV 40mg x1 in ED. He is being admitted for LGIB and pna. (15 Jul 2024 22:53)      1) s/p fall  - CT head ordered to r/o acute bleed.   - Pt placed on rock and roll belt   -bed alarm adjusted to most sensitive.

## 2024-07-16 NOTE — PROGRESS NOTE ADULT - ATTENDING COMMENTS
Patient is an 89 year old male with PMH of AMS, afib on eliquis +amiodarone MM, Prostate CA, HTN, and recent admission in [place] for an ulcer presenting for hypotension.    In ED, vitals were stable. Found to have BRBPR on ROMULO.     CT A/P  Right hilar hypodensity adjacent to the right lower lobar pulmonary   artery with suggestion of local mass effect on adjacent vasculature,   concerning for mass. Small right basilar pleural effusion with   consolidative opacities/nodularity.  L5 vertebral body sclerotic lesion concerning for metastatic disease.  Rectal mural thickening and hyperenhancement consistent with proctitis.   No abscess.  Intraluminal urinary bladder air which may reflect instrumentation versus   infection.  No evidence of active GI bleed.    CTH without acute pathology    CONSTITUTIONAL: Non-toxic, in NAD, AAO2   HEAD: NCAT MMM  ENT: Moist mucous membranes, normal pharynx with no erythema or exudates  NECK: Supple; full ROM. Nontender. No cervical LAD  CARD: RRR, no murmurs, rubs or gallops  RESP: Clear to ausculation bilaterally.  No rales, rhonchi, or wheezing.  ABD: Soft, non-distended, non-tender   EXT: Full ROM, no bony tenderness, no pedal edema, no calf tenderness  NEURO: Normal motor, normal sensory.   PSYCH: Cooperative, appropriate.      #LGIB  #Proctitis  hematochezia, BRBPR noted on ROMULO in ED   - Hb 10--> 9.7, currently stable   - continue to monitor for bleeding, and monitor cbc   - PPI 40mg  BID  - GI consulted given recent hx of ulcer , appreciate reccs   - no evidence of SIRS on admission, no need for abx     #EDUARDO, probable pre-renal etiology   -creatinine on admission=2> 1.8 today  - monitor renal function     #afib  - on Eliquis 5mg bid + amiodarone 400mg qd at home  - will hold Eliquis pending GI recs    #MM  #Prostate Cancer  - pt follows w/ Dr. Marie  - CT AP: L5 vertebral body sclerotic lesion concerning for metastatic disease.  - o/p f/u    #Right Hilar Suspected Mass - advise f/u OP Oncologist     #Dementia  #Hx of CVA  - AAOx1 at baseline  - per NH reports, patient takes:  mirtazapine 15mg half a tab qhs  depakote 125mg 2 capsules q8  keppra 750mg qd  - resume home medications     #BPH  - flomax 0.4mg qhs- resume     - DVT ppx: hold given concern for GIB     #Progress Note Handoff:  Pending: monitoring hemoglobin and for GIB, appreciate GI reccs. Monitor Cr . Ant dc 24-48 hours if clinically improved/ stable   Disposition: Home___/SNF___/Other________/Unknown at this time___x_____      Total time spent to complete patient's bedside assessment, review medical chart, discuss medical plan of care with covering medical team was more than 55 minutes  with >50% of time spent face to face with patient, discussion with patient/family and/or coordination of care. My note supersedes the medical resident note in case of discrepancy.       Lisseth Ch, DO Patient is an 89 year old male with PMH of AMS, afib on eliquis +amiodarone MM, Prostate CA, HTN, and recent admission in [place] for an ulcer presenting for hypotension.    In ED, vitals were stable. Found to have BRBPR on ROMULO.     CT A/P  Right hilar hypodensity adjacent to the right lower lobar pulmonary   artery with suggestion of local mass effect on adjacent vasculature,   concerning for mass. Small right basilar pleural effusion with   consolidative opacities/nodularity.  L5 vertebral body sclerotic lesion concerning for metastatic disease.  Rectal mural thickening and hyperenhancement consistent with proctitis.   No abscess.  Intraluminal urinary bladder air which may reflect instrumentation versus   infection.  No evidence of active GI bleed.    CTH without acute pathology    CONSTITUTIONAL: Non-toxic, in NAD, AAO2   HEAD: NCAT MMM  ENT: Moist mucous membranes, normal pharynx with no erythema or exudates  NECK: Supple; full ROM. Nontender. No cervical LAD  CARD: RRR, no murmurs, rubs or gallops  RESP: Clear to ausculation bilaterally.  No rales, rhonchi, or wheezing.  ABD: Soft, non-distended, non-tender   EXT: Full ROM, no bony tenderness, no pedal edema, no calf tenderness  NEURO: Normal motor, normal sensory.   PSYCH: Cooperative, appropriate.      #LGIB  #Proctitis  hematochezia, BRBPR noted on ROMULO in ED   - Hb 10--> 9.7, currently stable   - continue to monitor for bleeding, and monitor cbc   - PPI 40mg  BID  - GI consulted given recent hx of ulcer , appreciate reccs   - no evidence of SIRS on admission, no need for abx     #EDUARDO, probable pre-renal etiology   -creatinine on admission=2> 1.8 today  - monitor renal function     #afib  - on Eliquis 5mg bid + amiodarone 400mg qd at home  - will hold Eliquis pending GI recs    #MM  #Prostate Cancer  - pt follows w/ Dr. Marie  - CT AP: L5 vertebral body sclerotic lesion concerning for metastatic disease.  - o/p f/u    #Right Hilar Suspected Mass - advise f/u OP Oncologist     #Dementia  #Hx of CVA  - AAOx1 at baseline  - per NH reports, patient takes:  mirtazapine 15mg half a tab qhs  depakote 125mg 2 capsules q8  keppra 750mg qd  - resume home medications     #BPH  - flomax 0.4mg qhs- resume     #Fall 7/16   - unwitnessed, but no obvious signs of trauma   - CTH no acute pathology , chronic microvascular changes     - DVT ppx: hold given concern for GIB     #Progress Note Handoff:  Pending: monitoring hemoglobin and for GIB, appreciate GI reccs. Monitor Cr . Ant dc 24-48 hours if clinically improved/ stable   Disposition: Home___/SNF___/Other________/Unknown at this time___x_____      Total time spent to complete patient's bedside assessment, review medical chart, discuss medical plan of care with covering medical team was more than 55 minutes  with >50% of time spent face to face with patient, discussion with patient/family and/or coordination of care. My note supersedes the medical resident note in case of discrepancy.       Lisseth Ch, DO

## 2024-07-16 NOTE — PROVIDER CONTACT NOTE (FALL NOTIFICATION) - SITUATION
HPI     DLS;11/20/18  Pt states no Va problems, wears OTC reader +1.75 but didn't bring with   him.   No f/f  DM--  systane gtts PRN  LBS: 87  Hemoglobin A1C       Date                     Value               Ref Range             Status                06/18/2019               7.0 (H)             4.0 - 5.6 %           Final                      02/21/2019               7.3 (H)             4.0 - 5.6 %           Final                     08/16/2018               7.4 (H)             4.0 - 5.6 %           Final                   Last edited by Felton Mcmullen, OD on 11/20/2019 10:20 AM. (History)        ROS     Positive for: Endocrine (DM), Eyes (cat surgery OU)    Negative for: Constitutional, Gastrointestinal, Neurological, Skin,   Genitourinary, Musculoskeletal, HENT, Cardiovascular, Respiratory,   Psychiatric, Allergic/Imm, Heme/Lymph    Last edited by Felton Mcmullen, OD on 11/20/2019 10:24 AM. (History)        Assessment /Plan     For exam results, see Encounter Report.    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 Sp pciol/YAG OU--pt happy w otc readers  2. DM- WITHOUT RETINOPATHY.  Advised yearly DFE  3. RONNELL--advised SYSTANE ATs prn    PLAN:    rtc 1 yr                  Pt bed alarm was going off, RN entered the room and observed pt lying on the floor on his left side. No blood noted. Pt was unable to recall exact detail, "I did something stupid".   Pt is A&O x1.

## 2024-07-16 NOTE — PROGRESS NOTE ADULT - SUBJECTIVE AND OBJECTIVE BOX
SUBJECTIVE/OVERNIGHT EVENTS  Today is hospital day 1d. This morning patient was seen and examined at bedside, resting comfortably in bed. Had a fall incident this morning, when he was getting out of bed "to go to a ". Has AMS.    CODE STATUS: full    MEDICATIONS  STANDING MEDICATIONS  cefepime   IVPB 2000 milliGRAM(s) IV Intermittent every 24 hours  lactated ringers. 1000 milliLiter(s) IV Continuous <Continuous>  pantoprazole  Injectable 40 milliGRAM(s) IV Push two times a day    PRN MEDICATIONS    VITALS  T(F): 98.2 (24 @ 12:15), Max: 98.2 (24 @ 12:15)  HR: 74 (24 @ 12:15) (63 - 86)  BP: 134/77 (24 @ 12:15) (90/54 - 134/77)  RR: 20 (24 @ 12:15) (18 - 20)  SpO2: 95% (24 @ 08:50) (92% - 100%)    PHYSICAL EXAM  CONSTITUTIONAL: Non-toxic, in NAD  SKIN: Cool and dry, normal skin turgor  HEAD: NCAT  EYES: No conjunctival injection, scleral anicteric  ENT: Moist mucous membranes, normal pharynx with no erythema or exudates  NECK: Supple; full ROM. Nontender. No cervical LAD  CARD: RRR, no murmurs, rubs or gallops  RESP: Clear to ausculation bilaterally.  No rales, rhonchi, or wheezing.  ABD: Soft, non-distended, non-tender   EXT: Full ROM, no bony tenderness, no pedal edema, no calf tenderness  NEURO: Normal motor, normal sensory.   PSYCH: Cooperative, appropriate.    LABS             9.7    6.51  )-----------( 217      ( 24 @ 07:09 )             29.5     146  |  108  |  29  -------------------------<  109   24 @ 07:09  4.5  |  25  |  1.8    Ca      8.7     24 @ 07:09  Mg     2.8     24 @ 07:09    TPro  6.4  /  Alb  3.2  /  TBili  0.5  /  DBili  x   /  AST  14  /  ALT  9   /  AlkPhos  70  /  GGT  x     24 @ 07:09    PT/INR - ( 07-15-24 @ 19:00 )   PT: 24.20 sec<H>;   INR: 2.11 ratio<H>  PTT - ( 07-15-24 @ 19:00 )  PTT:41.0 sec      Urinalysis Basic - ( 2024 07:09 )    Color: x / Appearance: x / SG: x / pH: x  Gluc: 109 mg/dL / Ketone: x  / Bili: x / Urobili: x   Blood: x / Protein: x / Nitrite: x   Leuk Esterase: x / RBC: x / WBC x   Sq Epi: x / Non Sq Epi: x / Bacteria: x          IMAGING SUBJECTIVE/OVERNIGHT EVENTS  Today is hospital day 1d. This morning patient was seen and examined at bedside, resting comfortably in bed. Had a fall incident this morning, when he was getting out of bed "to go to a ". Has AMS.    CODE STATUS: full    MEDICATIONS  STANDING MEDICATIONS  cefepime   IVPB 2000 milliGRAM(s) IV Intermittent every 24 hours  lactated ringers. 1000 milliLiter(s) IV Continuous <Continuous>  pantoprazole  Injectable 40 milliGRAM(s) IV Push two times a day    PRN MEDICATIONS    VITALS  T(F): 98.2 (24 @ 12:15), Max: 98.2 (24 @ 12:15)  HR: 74 (24 @ 12:15) (63 - 86)  BP: 134/77 (24 @ 12:15) (90/54 - 134/77)  RR: 20 (24 @ 12:15) (18 - 20)  SpO2: 95% (24 @ 08:50) (92% - 100%)    PHYSICAL EXAM  CONSTITUTIONAL: Non-toxic, in NAD, AAO2   SKIN: Cool and dry, normal skin turgor  HEAD: NCAT  EYES: No conjunctival injection, scleral anicteric  ENT: Moist mucous membranes, normal pharynx with no erythema or exudates  NECK: Supple; full ROM. Nontender. No cervical LAD  CARD: RRR, no murmurs, rubs or gallops  RESP: Clear to ausculation bilaterally.  No rales, rhonchi, or wheezing.  ABD: Soft, non-distended, non-tender   EXT: Full ROM, no bony tenderness, no pedal edema, no calf tenderness  NEURO: Normal motor, normal sensory.   PSYCH: Cooperative, appropriate.    LABS             9.7    6.51  )-----------( 217      ( 24 @ 07:09 )             29.5     146  |  108  |  29  -------------------------<  109   24 @ 07:09  4.5  |  25  |  1.8    Ca      8.7     24 @ 07:09  Mg     2.8     24 @ 07:09    TPro  6.4  /  Alb  3.2  /  TBili  0.5  /  DBili  x   /  AST  14  /  ALT  9   /  AlkPhos  70  /  GGT  x     24 @ 07:09    PT/INR - ( 07-15-24 @ 19:00 )   PT: 24.20 sec<H>;   INR: 2.11 ratio<H>  PTT - ( 07-15-24 @ 19:00 )  PTT:41.0 sec      Urinalysis Basic - ( 2024 07:09 )    Color: x / Appearance: x / SG: x / pH: x  Gluc: 109 mg/dL / Ketone: x  / Bili: x / Urobili: x   Blood: x / Protein: x / Nitrite: x   Leuk Esterase: x / RBC: x / WBC x   Sq Epi: x / Non Sq Epi: x / Bacteria: x          IMAGING

## 2024-07-16 NOTE — PATIENT PROFILE ADULT - LEGAL HELP
His son is handling his coumadin   He is confused as to what he is taking  He was recently in hospital  He will have son call   Son does not get home until after 6 pm no

## 2024-07-16 NOTE — CONSULT NOTE ADULT - ATTENDING COMMENTS
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 Recommend rectal mesalamine QD. If drops Hgb may offer APC (flex sig). May continue AC. will follow

## 2024-07-17 LAB
ANION GAP SERPL CALC-SCNC: 16 MMOL/L — HIGH (ref 7–14)
BASOPHILS # BLD AUTO: 0.01 K/UL — SIGNIFICANT CHANGE UP (ref 0–0.2)
BASOPHILS # BLD AUTO: 0.02 K/UL — SIGNIFICANT CHANGE UP (ref 0–0.2)
BASOPHILS NFR BLD AUTO: 0.2 % — SIGNIFICANT CHANGE UP (ref 0–1)
BASOPHILS NFR BLD AUTO: 0.4 % — SIGNIFICANT CHANGE UP (ref 0–1)
BUN SERPL-MCNC: 26 MG/DL — HIGH (ref 10–20)
CALCIUM SERPL-MCNC: 8.6 MG/DL — SIGNIFICANT CHANGE UP (ref 8.4–10.4)
CHLORIDE SERPL-SCNC: 110 MMOL/L — SIGNIFICANT CHANGE UP (ref 98–110)
CO2 SERPL-SCNC: 20 MMOL/L — SIGNIFICANT CHANGE UP (ref 17–32)
CREAT SERPL-MCNC: 1.8 MG/DL — HIGH (ref 0.7–1.5)
EGFR: 36 ML/MIN/1.73M2 — LOW
EOSINOPHIL # BLD AUTO: 0.06 K/UL — SIGNIFICANT CHANGE UP (ref 0–0.7)
EOSINOPHIL # BLD AUTO: 0.09 K/UL — SIGNIFICANT CHANGE UP (ref 0–0.7)
EOSINOPHIL NFR BLD AUTO: 1.2 % — SIGNIFICANT CHANGE UP (ref 0–8)
EOSINOPHIL NFR BLD AUTO: 1.8 % — SIGNIFICANT CHANGE UP (ref 0–8)
GLUCOSE SERPL-MCNC: 91 MG/DL — SIGNIFICANT CHANGE UP (ref 70–99)
HCT VFR BLD CALC: 24.8 % — LOW (ref 42–52)
HCT VFR BLD CALC: 26.3 % — LOW (ref 42–52)
HGB BLD-MCNC: 8.2 G/DL — LOW (ref 14–18)
HGB BLD-MCNC: 8.6 G/DL — LOW (ref 14–18)
IMM GRANULOCYTES NFR BLD AUTO: 0.8 % — HIGH (ref 0.1–0.3)
IMM GRANULOCYTES NFR BLD AUTO: 1 % — HIGH (ref 0.1–0.3)
LYMPHOCYTES # BLD AUTO: 1.78 K/UL — SIGNIFICANT CHANGE UP (ref 1.2–3.4)
LYMPHOCYTES # BLD AUTO: 1.85 K/UL — SIGNIFICANT CHANGE UP (ref 1.2–3.4)
LYMPHOCYTES # BLD AUTO: 34.3 % — SIGNIFICANT CHANGE UP (ref 20.5–51.1)
LYMPHOCYTES # BLD AUTO: 36.5 % — SIGNIFICANT CHANGE UP (ref 20.5–51.1)
MAGNESIUM SERPL-MCNC: 2 MG/DL — SIGNIFICANT CHANGE UP (ref 1.8–2.4)
MCHC RBC-ENTMCNC: 27.2 PG — SIGNIFICANT CHANGE UP (ref 27–31)
MCHC RBC-ENTMCNC: 27.6 PG — SIGNIFICANT CHANGE UP (ref 27–31)
MCHC RBC-ENTMCNC: 32.7 G/DL — SIGNIFICANT CHANGE UP (ref 32–37)
MCHC RBC-ENTMCNC: 33.1 G/DL — SIGNIFICANT CHANGE UP (ref 32–37)
MCV RBC AUTO: 83.2 FL — SIGNIFICANT CHANGE UP (ref 80–94)
MCV RBC AUTO: 83.5 FL — SIGNIFICANT CHANGE UP (ref 80–94)
MONOCYTES # BLD AUTO: 0.47 K/UL — SIGNIFICANT CHANGE UP (ref 0.1–0.6)
MONOCYTES # BLD AUTO: 0.62 K/UL — HIGH (ref 0.1–0.6)
MONOCYTES NFR BLD AUTO: 12.2 % — HIGH (ref 1.7–9.3)
MONOCYTES NFR BLD AUTO: 9.1 % — SIGNIFICANT CHANGE UP (ref 1.7–9.3)
NEUTROPHILS # BLD AUTO: 2.45 K/UL — SIGNIFICANT CHANGE UP (ref 1.4–6.5)
NEUTROPHILS # BLD AUTO: 2.82 K/UL — SIGNIFICANT CHANGE UP (ref 1.4–6.5)
NEUTROPHILS NFR BLD AUTO: 48.3 % — SIGNIFICANT CHANGE UP (ref 42.2–75.2)
NEUTROPHILS NFR BLD AUTO: 54.2 % — SIGNIFICANT CHANGE UP (ref 42.2–75.2)
NRBC # BLD: 0 /100 WBCS — SIGNIFICANT CHANGE UP (ref 0–0)
NRBC # BLD: 0 /100 WBCS — SIGNIFICANT CHANGE UP (ref 0–0)
PLATELET # BLD AUTO: 165 K/UL — SIGNIFICANT CHANGE UP (ref 130–400)
PLATELET # BLD AUTO: 184 K/UL — SIGNIFICANT CHANGE UP (ref 130–400)
PMV BLD: 10.8 FL — HIGH (ref 7.4–10.4)
PMV BLD: 10.8 FL — HIGH (ref 7.4–10.4)
POTASSIUM SERPL-MCNC: 4.3 MMOL/L — SIGNIFICANT CHANGE UP (ref 3.5–5)
POTASSIUM SERPL-SCNC: 4.3 MMOL/L — SIGNIFICANT CHANGE UP (ref 3.5–5)
RBC # BLD: 2.97 M/UL — LOW (ref 4.7–6.1)
RBC # BLD: 3.16 M/UL — LOW (ref 4.7–6.1)
RBC # FLD: 16.7 % — HIGH (ref 11.5–14.5)
RBC # FLD: 16.9 % — HIGH (ref 11.5–14.5)
SODIUM SERPL-SCNC: 146 MMOL/L — SIGNIFICANT CHANGE UP (ref 135–146)
WBC # BLD: 5.07 K/UL — SIGNIFICANT CHANGE UP (ref 4.8–10.8)
WBC # BLD: 5.19 K/UL — SIGNIFICANT CHANGE UP (ref 4.8–10.8)
WBC # FLD AUTO: 5.07 K/UL — SIGNIFICANT CHANGE UP (ref 4.8–10.8)
WBC # FLD AUTO: 5.19 K/UL — SIGNIFICANT CHANGE UP (ref 4.8–10.8)

## 2024-07-17 PROCEDURE — 99497 ADVNCD CARE PLAN 30 MIN: CPT | Mod: 25

## 2024-07-17 PROCEDURE — 99232 SBSQ HOSP IP/OBS MODERATE 35: CPT | Mod: 25

## 2024-07-17 RX ORDER — VALPROIC ACID 250 MG/5ML
250 SOLUTION ORAL THREE TIMES A DAY
Refills: 0 | Status: DISCONTINUED | OUTPATIENT
Start: 2024-07-17 | End: 2024-07-19

## 2024-07-17 RX ORDER — LEVETIRACETAM 100 MG/ML
750 INJECTION INTRAVENOUS DAILY
Refills: 0 | Status: DISCONTINUED | OUTPATIENT
Start: 2024-07-17 | End: 2024-07-19

## 2024-07-17 RX ADMIN — MIRTAZAPINE 7.5 MILLIGRAM(S): 15 TABLET, FILM COATED ORAL at 21:57

## 2024-07-17 RX ADMIN — APIXABAN 2.5 MILLIGRAM(S): 5 TABLET, FILM COATED ORAL at 21:56

## 2024-07-17 RX ADMIN — LEVETIRACETAM 750 MILLIGRAM(S): 100 INJECTION INTRAVENOUS at 12:27

## 2024-07-17 RX ADMIN — PANTOPRAZOLE SODIUM 40 MILLIGRAM(S): 40 INJECTION, POWDER, FOR SOLUTION INTRAVENOUS at 05:54

## 2024-07-17 RX ADMIN — AMIODARONE HYDROCHLORIDE 400 MILLIGRAM(S): 50 INJECTION, SOLUTION INTRAVENOUS at 05:54

## 2024-07-17 RX ADMIN — Medication 1 MILLIGRAM(S): at 12:27

## 2024-07-17 RX ADMIN — PANTOPRAZOLE SODIUM 40 MILLIGRAM(S): 40 INJECTION, POWDER, FOR SOLUTION INTRAVENOUS at 18:23

## 2024-07-17 RX ADMIN — VALPROIC ACID 250 MILLIGRAM(S): 250 SOLUTION ORAL at 21:56

## 2024-07-17 RX ADMIN — CALCITRIOL 0.25 MICROGRAM(S): 0.25 CAPSULE, LIQUID FILLED ORAL at 12:27

## 2024-07-17 RX ADMIN — APIXABAN 2.5 MILLIGRAM(S): 5 TABLET, FILM COATED ORAL at 05:54

## 2024-07-17 RX ADMIN — MESALAMINE 1000 MILLIGRAM(S): 500 CAPSULE ORAL at 21:58

## 2024-07-17 RX ADMIN — TAMSULOSIN HYDROCHLORIDE 0.4 MILLIGRAM(S): 0.4 CAPSULE ORAL at 21:57

## 2024-07-17 RX ADMIN — VALPROIC ACID 52.5 MILLIGRAM(S): 250 SOLUTION ORAL at 06:26

## 2024-07-17 NOTE — PROGRESS NOTE ADULT - ASSESSMENT
Patient is an 89 year old male with PMH of AMS, afib on eliquis +amiodarone MM, Prostate CA, HTN, and recent admission in [place] for an ulcer presenting for hypotension, He is being admitted for management of LGIB w/ pna.    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 Recommend rectal mesalamine QD. If drops Hgb may offer APC (flex sig). continue AC. no rec for flex sigmoidoscopy for now as per gastro.    #PNA-ruled out   -small right basilar pleural effusion (parapneumonic?) w/ consolidative opacities noted on CT AP  -s/p cefepime/levaquin in ED. received cefepime  - vitally stable, no sirs  - f/u sputum cx/gram stain, procal, urine strep/legionella, MRSA swab, procal  -RVP -ve  - tylenol prn    #EDUARDO  -creatinine on admission=2> 1.8, stable  - received IV hydration  - baseline creatinine was request from pt's PMD Dr. Collins [phone]    #afib  - on Eliquis 5mg bid + amiodarone 400mg qd    #MM  #Prostate Cancer  - pt follows w/ Dr. Marie  - CT AP: L5 vertebral body sclerotic lesion concerning for metastatic disease.  - o/p f/u    #Dementia  #Hx of CVA  - AAOx1 at baseline  - per NH reports, patient takes:  mirtazapine 15mg half a tab qhs  depakote 125mg 2 capsules q8  keppra 750mg qd  - hold pending GI recs  - restart when able    #BPH  - flomax 0.4mg qhs    #GOC  -discussed with daughter dnr/dni 7/17- she will discuss with her 2 sisters, follow up 7/18  -full code for now     # Misc  - GI ppx: protonix BID  - Diet: dash  - Activity: as tolerated  - Pending: h/h bid, call back from pmd on creatinine, goc

## 2024-07-17 NOTE — GOALS OF CARE CONVERSATION - ADVANCED CARE PLANNING - CONVERSATION DETAILS
Discussed what dnr/dni means and likelihood that patient will continue to medically deteriorate in the next 1-2 years due to severe dementia.  Pt is to be full code for now and Carmen will discuss the situation with the rest of her family.

## 2024-07-17 NOTE — PROGRESS NOTE ADULT - SUBJECTIVE AND OBJECTIVE BOX
Patient is a 89y old  Male who presents with a chief complaint of     Patient seen and examined at bedside.    ALLERGIES:  No Known Allergies    MEDICATIONS:  aMIOdarone    Tablet 400 milliGRAM(s) Oral daily  apixaban 2.5 milliGRAM(s) Oral every 12 hours  calcitriol   Capsule 0.25 MICROGram(s) Oral daily  folic acid 1 milliGRAM(s) Oral daily  levETIRAcetam 750 milliGRAM(s) Oral daily  mesalamine Suppository 1000 milliGRAM(s) Rectal at bedtime  mirtazapine 7.5 milliGRAM(s) Oral at bedtime  pantoprazole  Injectable 40 milliGRAM(s) IV Push two times a day  tamsulosin 0.4 milliGRAM(s) Oral at bedtime  valproic  acid Syrup 250 milliGRAM(s) Oral three times a day    Vital Signs Last 24 Hrs  T(F): 97.5 (17 Jul 2024 14:39), Max: 97.7 (16 Jul 2024 20:28)  HR: 75 (17 Jul 2024 14:39) (73 - 75)  BP: 150/68 (17 Jul 2024 14:39) (101/54 - 150/68)  RR: 18 (17 Jul 2024 14:39) (18 - 19)  SpO2: 98% (17 Jul 2024 14:39) (97% - 100%)  I&O's Summary      PHYSICAL EXAM:  General: NAD, alert  ENT: MMM  Neck: Supple, No JVD  Lungs: diminished lung sounds, no wheezing   Cardio: RRR, S1/S2, 2/6 systolic murmur   Abdomen: Soft, Nontender, Nondistended; Bowel sounds present  Extremities: No cyanosis, No edema    LABS:                        8.6    5.19  )-----------( 184      ( 17 Jul 2024 08:07 )             26.3     07-17    146  |  110  |  26  ----------------------------<  91  4.3   |  20  |  1.8    Ca    8.6      17 Jul 2024 08:07  Mg     2.0     07-17    TPro  6.4  /  Alb  3.2  /  TBili  0.5  /  DBili  x   /  AST  14  /  ALT  9   /  AlkPhos  70  07-16      PT/INR - ( 15 Jul 2024 19:00 )   PT: 24.20 sec;   INR: 2.11 ratio         PTT - ( 15 Jul 2024 19:00 )  PTT:41.0 sec  Lactate, Blood: 1.7 mmol/L (07-15 @ 17:10)            17:32 - VBG - pH: 7.37  | pCO2: 43    | pO2: 12    | Lactate: 1.8                  Urinalysis Basic - ( 17 Jul 2024 08:07 )    Color: x / Appearance: x / SG: x / pH: x  Gluc: 91 mg/dL / Ketone: x  / Bili: x / Urobili: x   Blood: x / Protein: x / Nitrite: x   Leuk Esterase: x / RBC: x / WBC x   Sq Epi: x / Non Sq Epi: x / Bacteria: x            RADIOLOGY & ADDITIONAL TESTS:    Care Discussed with Consultants/Other Providers:

## 2024-07-17 NOTE — PROGRESS NOTE ADULT - ASSESSMENT
Patient is an 89 year old male with PMH of AMS, afib on eliquis +amiodarone MM, Prostate CA, HTN, and recent admission in [place] for an ulcer presenting for hypotension, He is being admitted for management of LGIB w/ pna.    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 Recommend rectal mesalamine QD. If drops Hgb may offer APC (flex sig). continue AC. no rec for flex sigmoidoscopy for now as per gastro.    #PNA  -small right basilar pleural effusion (parapneumonic?) w/ consolidative opacities noted on CT AP  -s/p cefepime/levaquin in ED. received cefepime  - vitally stable, no sirs  - f/u sputum cx/gram stain, procal, urine strep/legionella, MRSA swab, procal  -RVP -ve  - tylenol prn    #EDUARDO, probable pre-renal etiology   -creatinine on admission=2> 1.8, stable  - received IV hydration    #afib  - on Eliquis 5mg bid + amiodarone 400mg qd    #MM  #Prostate Cancer  - pt follows w/ Dr. Marie  - CT AP: L5 vertebral body sclerotic lesion concerning for metastatic disease.  - o/p f/u    #Dementia  #Hx of CVA  - AAOx1 at baseline  - per NH reports, patient takes:  mirtazapine 15mg half a tab qhs  depakote 125mg 2 capsules q8  keppra 750mg qd  - hold pending GI recs  - restart when able    #BPH  - flomax 0.4mg qhs    # Misc  - GI ppx: protonix BID  - Diet: NPO  - Activity: bedrest           Patient is an 89 year old male with PMH of dementia with AMS, afib on eliquis +amiodarone MM, Prostate CA, HTN, and recent admission in [place] for an ulcer presenting for hypotension, He is being admitted for management of LGIB w/ pna.    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 Recommend rectal mesalamine QD. If drops Hgb may offer APC (flex sig).   -continue AC. no rec for flex sigmoidoscopy for now as per gastro.    #PNA  -small right basilar pleural effusion (parapneumonic?) w/ consolidative opacities noted on CT AP  -s/p cefepime/levaquin in ED. received cefepime  - vitally stable, no sirs  - f/u sputum cx/gram stain, procal, urine strep/legionella, MRSA swab, procal  -RVP -ve  - tylenol prn    #EDUARDO, probable pre-renal etiology   -creatinine on admission=2> 1.8, stable  - received IV hydration    #afib  - on Eliquis 5mg bid + amiodarone 400mg qd    #MM  #Prostate Cancer  - pt follows w/ Dr. Marie  - CT AP: L5 vertebral body sclerotic lesion concerning for metastatic disease.  - o/p f/u    #Dementia  #Hx of CVA  - AAOx1 at baseline  - per NH reports, patient takes:  mirtazapine 15mg half a tab qhs  depakote 125mg 2 capsules q8  keppra 750mg qd    #BPH  - flomax 0.4mg qhs    # Misc  - GI ppx: protonix BID  - Diet: NPO  - Activity: bedrest

## 2024-07-17 NOTE — PROGRESS NOTE ADULT - SUBJECTIVE AND OBJECTIVE BOX
SUBJECTIVE/OVERNIGHT EVENTS  Today is hospital day 2d. This morning patient was seen and examined at bedside, resting comfortably in bed. No acute or major events overnight.    CODE STATUS:full    MEDICATIONS  STANDING MEDICATIONS  aMIOdarone    Tablet 400 milliGRAM(s) Oral daily  apixaban 2.5 milliGRAM(s) Oral every 12 hours  calcitriol   Capsule 0.25 MICROGram(s) Oral daily  folic acid 1 milliGRAM(s) Oral daily  levETIRAcetam   Injectable 750 milliGRAM(s) IV Push daily  mesalamine Suppository 1000 milliGRAM(s) Rectal at bedtime  mirtazapine 7.5 milliGRAM(s) Oral at bedtime  pantoprazole  Injectable 40 milliGRAM(s) IV Push two times a day  tamsulosin 0.4 milliGRAM(s) Oral at bedtime  valproate sodium   IVPB 250 milliGRAM(s) IV Intermittent three times a day    PRN MEDICATIONS    VITALS  T(F): 97.7 (07-17-24 @ 04:40), Max: 97.7 (07-16-24 @ 20:28)  HR: 73 (07-17-24 @ 04:40) (73 - 75)  BP: 102/66 (07-17-24 @ 04:40) (101/54 - 102/66)  RR: 18 (07-17-24 @ 04:40) (18 - 19)  SpO2: 100% (07-17-24 @ 08:43) (97% - 100%)    PHYSICAL EXAM  GENERAL  (x  ) NAD, lying in bed comfortably     (  ) obtunded     (  ) lethargic     (  ) somnolent    HEAD  (x  ) Atraumatic     (  ) hematoma     (  ) laceration (specify location:       )     NECK  ( x ) Supple     (  ) neck stiffness     (  ) nuchal rigidity     (  )  no JVD     (  ) JVD present ( -- cm)    HEART  Rate -->  ( x ) normal rate    (  ) bradycardic    (  ) tachycardic  Rhythm -->  (x  ) regular    (  ) regularly irregular    (  ) irregularly irregular  Murmurs -->  ( x ) normal s1/s2    (  ) systolic murmur    (  ) diastolic murmur    (  ) continuous murmur     (  ) S3 present    (  ) S4 present    LUNGS  ( x )Unlabored respirations     (  ) tachypnea  ( x ) B/L air entry     (  ) decreased breath sounds in:  (location     )    ( x ) no adventitious sound     (  ) crackles     (  ) wheezing      (  ) rhonchi      (specify location:       )  (  ) chest wall tenderness (specify location:       )    ABDOMEN  (  x) Soft     (  ) tense   |   (  ) nondistended     (  ) distended   |   (  ) +BS     (  ) hypoactive bowel sounds     (  ) hyperactive bowel sounds  (x  ) nontender     (  ) RUQ tenderness     (  ) RLQ tenderness     (  ) LLQ tenderness     (  ) epigastric tenderness     (  ) diffuse tenderness  (  ) Splenomegaly      (  ) Hepatomegaly      (  ) Jaundice     (  ) ecchymosis     EXTREMITIES  (x  ) Normal     (  ) Rash     (  ) ecchymosis     (  ) varicose veins      (  ) pitting edema     (  ) non-pitting edema   (  ) ulceration     (  ) gangrene:     (location:     )    NERVOUS SYSTEM  (  ) A&Ox3     ( x ) confused     (  ) lethargic    SKIN  ( x ) No rashes or lesions     (  ) maculopapular rash     (  ) pustules     (  ) vesicles     (  ) ulcer     (  ) ecchymosis     (specify location:     )    LABS             8.6    5.19  )-----------( 184      ( 07-17-24 @ 08:07 )             26.3     146  |  110  |  26  -------------------------<  91   07-17-24 @ 08:07  4.3  |  20  |  1.8    Ca      8.6     07-17-24 @ 08:07  Mg     2.0     07-17-24 @ 08:07    TPro  6.4  /  Alb  3.2  /  TBili  0.5  /  DBili  x   /  AST  14  /  ALT  9   /  AlkPhos  70  /  GGT  x     07-16-24 @ 07:09    PT/INR - ( 07-15-24 @ 19:00 )   PT: 24.20 sec<H>;   INR: 2.11 ratio<H>  PTT - ( 07-15-24 @ 19:00 )  PTT:41.0 sec      Urinalysis Basic - ( 17 Jul 2024 08:07 )    Color: x / Appearance: x / SG: x / pH: x  Gluc: 91 mg/dL / Ketone: x  / Bili: x / Urobili: x   Blood: x / Protein: x / Nitrite: x   Leuk Esterase: x / RBC: x / WBC x   Sq Epi: x / Non Sq Epi: x / Bacteria: x          IMAGING

## 2024-07-18 LAB
ANION GAP SERPL CALC-SCNC: 11 MMOL/L — SIGNIFICANT CHANGE UP (ref 7–14)
BASOPHILS # BLD AUTO: 0.02 K/UL — SIGNIFICANT CHANGE UP (ref 0–0.2)
BASOPHILS NFR BLD AUTO: 0.4 % — SIGNIFICANT CHANGE UP (ref 0–1)
BUN SERPL-MCNC: 20 MG/DL — SIGNIFICANT CHANGE UP (ref 10–20)
CALCIUM SERPL-MCNC: 8.6 MG/DL — SIGNIFICANT CHANGE UP (ref 8.4–10.5)
CHLORIDE SERPL-SCNC: 110 MMOL/L — SIGNIFICANT CHANGE UP (ref 98–110)
CO2 SERPL-SCNC: 23 MMOL/L — SIGNIFICANT CHANGE UP (ref 17–32)
CREAT SERPL-MCNC: 1.7 MG/DL — HIGH (ref 0.7–1.5)
EGFR: 38 ML/MIN/1.73M2 — LOW
EOSINOPHIL # BLD AUTO: 0.1 K/UL — SIGNIFICANT CHANGE UP (ref 0–0.7)
EOSINOPHIL NFR BLD AUTO: 2.2 % — SIGNIFICANT CHANGE UP (ref 0–8)
GLUCOSE SERPL-MCNC: 96 MG/DL — SIGNIFICANT CHANGE UP (ref 70–99)
HCT VFR BLD CALC: 27.1 % — LOW (ref 42–52)
HGB BLD-MCNC: 8.9 G/DL — LOW (ref 14–18)
IMM GRANULOCYTES NFR BLD AUTO: 0.9 % — HIGH (ref 0.1–0.3)
LYMPHOCYTES # BLD AUTO: 1.57 K/UL — SIGNIFICANT CHANGE UP (ref 1.2–3.4)
LYMPHOCYTES # BLD AUTO: 35.2 % — SIGNIFICANT CHANGE UP (ref 20.5–51.1)
MAGNESIUM SERPL-MCNC: 1.9 MG/DL — SIGNIFICANT CHANGE UP (ref 1.8–2.4)
MCHC RBC-ENTMCNC: 27.4 PG — SIGNIFICANT CHANGE UP (ref 27–31)
MCHC RBC-ENTMCNC: 32.8 G/DL — SIGNIFICANT CHANGE UP (ref 32–37)
MCV RBC AUTO: 83.4 FL — SIGNIFICANT CHANGE UP (ref 80–94)
MONOCYTES # BLD AUTO: 0.34 K/UL — SIGNIFICANT CHANGE UP (ref 0.1–0.6)
MONOCYTES NFR BLD AUTO: 7.6 % — SIGNIFICANT CHANGE UP (ref 1.7–9.3)
NEUTROPHILS # BLD AUTO: 2.39 K/UL — SIGNIFICANT CHANGE UP (ref 1.4–6.5)
NEUTROPHILS NFR BLD AUTO: 53.7 % — SIGNIFICANT CHANGE UP (ref 42.2–75.2)
NRBC # BLD: 0 /100 WBCS — SIGNIFICANT CHANGE UP (ref 0–0)
PLATELET # BLD AUTO: 188 K/UL — SIGNIFICANT CHANGE UP (ref 130–400)
PMV BLD: 10.9 FL — HIGH (ref 7.4–10.4)
POTASSIUM SERPL-MCNC: 4.4 MMOL/L — SIGNIFICANT CHANGE UP (ref 3.5–5)
POTASSIUM SERPL-SCNC: 4.4 MMOL/L — SIGNIFICANT CHANGE UP (ref 3.5–5)
RBC # BLD: 3.25 M/UL — LOW (ref 4.7–6.1)
RBC # FLD: 17 % — HIGH (ref 11.5–14.5)
SODIUM SERPL-SCNC: 144 MMOL/L — SIGNIFICANT CHANGE UP (ref 135–146)
WBC # BLD: 4.46 K/UL — LOW (ref 4.8–10.8)
WBC # FLD AUTO: 4.46 K/UL — LOW (ref 4.8–10.8)

## 2024-07-18 PROCEDURE — 95816 EEG AWAKE AND DROWSY: CPT | Mod: 26

## 2024-07-18 PROCEDURE — 99232 SBSQ HOSP IP/OBS MODERATE 35: CPT

## 2024-07-18 RX ORDER — PANTOPRAZOLE SODIUM 40 MG/10ML
40 INJECTION, POWDER, FOR SOLUTION INTRAVENOUS
Refills: 0 | Status: DISCONTINUED | OUTPATIENT
Start: 2024-07-18 | End: 2024-07-19

## 2024-07-18 RX ADMIN — LEVETIRACETAM 750 MILLIGRAM(S): 100 INJECTION INTRAVENOUS at 13:02

## 2024-07-18 RX ADMIN — AMIODARONE HYDROCHLORIDE 400 MILLIGRAM(S): 50 INJECTION, SOLUTION INTRAVENOUS at 06:04

## 2024-07-18 RX ADMIN — VALPROIC ACID 250 MILLIGRAM(S): 250 SOLUTION ORAL at 21:15

## 2024-07-18 RX ADMIN — APIXABAN 2.5 MILLIGRAM(S): 5 TABLET, FILM COATED ORAL at 06:03

## 2024-07-18 RX ADMIN — APIXABAN 2.5 MILLIGRAM(S): 5 TABLET, FILM COATED ORAL at 17:23

## 2024-07-18 RX ADMIN — VALPROIC ACID 250 MILLIGRAM(S): 250 SOLUTION ORAL at 06:03

## 2024-07-18 RX ADMIN — CALCITRIOL 0.25 MICROGRAM(S): 0.25 CAPSULE, LIQUID FILLED ORAL at 13:03

## 2024-07-18 RX ADMIN — Medication 1 MILLIGRAM(S): at 13:02

## 2024-07-18 RX ADMIN — MIRTAZAPINE 7.5 MILLIGRAM(S): 15 TABLET, FILM COATED ORAL at 21:16

## 2024-07-18 RX ADMIN — MESALAMINE 1000 MILLIGRAM(S): 500 CAPSULE ORAL at 21:16

## 2024-07-18 RX ADMIN — PANTOPRAZOLE SODIUM 40 MILLIGRAM(S): 40 INJECTION, POWDER, FOR SOLUTION INTRAVENOUS at 06:03

## 2024-07-18 RX ADMIN — VALPROIC ACID 250 MILLIGRAM(S): 250 SOLUTION ORAL at 13:02

## 2024-07-18 RX ADMIN — PANTOPRAZOLE SODIUM 40 MILLIGRAM(S): 40 INJECTION, POWDER, FOR SOLUTION INTRAVENOUS at 17:23

## 2024-07-18 RX ADMIN — TAMSULOSIN HYDROCHLORIDE 0.4 MILLIGRAM(S): 0.4 CAPSULE ORAL at 21:15

## 2024-07-18 NOTE — PHYSICAL THERAPY INITIAL EVALUATION ADULT - PERTINENT HX OF CURRENT PROBLEM, REHAB EVAL
Patient is an 89 year old male with PMH of AMS, afib on eliquis +amiodarone MM, Prostate CA, HTN, and recent admission in [place] for an ulcer presenting for hypotension, Patient was BIBEMS from [facility], [place] for reports of hypotension and lethargy/AMS. Patient denies reports of chest pain, shortness of breath, nausea, vomiting, abdominal pain, diarrhea, constipation, or additional complaints.

## 2024-07-18 NOTE — DIETITIAN INITIAL EVALUATION ADULT - OTHER CALCULATIONS
Using ABW: ENERGY: 8057-7855 kcal/day (20-25 kcal/kg); PROTEIN: 76-91 g/day (1-1.2 g/kg); FLUID: 1905 mL/day (25 mL/kg) -- with consideration for age, BMI

## 2024-07-18 NOTE — PHYSICAL THERAPY INITIAL EVALUATION ADULT - GENERAL OBSERVATIONS, REHAB EVAL
2:45-3:15 pt encountered in bed in NAD, 1:1 obs.  Patient performed bed mobility, transfers, unable to ambulate due to debility and cognitve status.  BP was 95/63 lying, 101/65 sitting, and 95/63 in standing.  Pt would benefit from continued PT to restore prior level of mobility and safety.

## 2024-07-18 NOTE — DIETITIAN INITIAL EVALUATION ADULT - PERTINENT MEDS FT
MEDICATIONS  (STANDING):  aMIOdarone    Tablet 400 milliGRAM(s) Oral daily  apixaban 2.5 milliGRAM(s) Oral every 12 hours  calcitriol   Capsule 0.25 MICROGram(s) Oral daily  folic acid 1 milliGRAM(s) Oral daily  levETIRAcetam 750 milliGRAM(s) Oral daily  mesalamine Suppository 1000 milliGRAM(s) Rectal at bedtime  mirtazapine 7.5 milliGRAM(s) Oral at bedtime  pantoprazole    Tablet 40 milliGRAM(s) Oral two times a day  tamsulosin 0.4 milliGRAM(s) Oral at bedtime  valproic  acid Syrup 250 milliGRAM(s) Oral three times a day    MEDICATIONS  (PRN):

## 2024-07-18 NOTE — DIETITIAN INITIAL EVALUATION ADULT - ADD RECOMMEND
1. ADD Ensure Plus HP 3X/DAILY -- provides 1050 kcal, 60g pro total  2. Continue with current diet order   3. Encourage PO intake and assist during meals prn    High nutrition risk; will f/u in 3-5 days or prn.

## 2024-07-18 NOTE — DIETITIAN INITIAL EVALUATION ADULT - OTHER INFO
Pertinent Medical Information: Per H&P, pt is an 89 year old male with PMHx of AMS, afib on eliquis +amiodarone MM, Prostate CA, HTN, and recent admission in [place] for an ulcer presenting for hypotension, Patient was BIBEMS from [facility] for reports of hypotension and lethargy/AMS.    Pertinent Subjective Information: Per aide, pt has fair appetite; consuming around 50% of meals provided in-house. Tolerating diet texture/consistency well. No nausea or vomiting reported.    Weight hx: UBW unknown. Current dosing weight is 76.2 KG. No previous admission weights in EMR. Unable to determine if pt meets weight criteria for malnutrition at this time. Will attempt to obtain UBW at follow up as able.

## 2024-07-18 NOTE — PROGRESS NOTE ADULT - ASSESSMENT
Patient is an 89 year old male with PMH of dementia with AMS, afib on eliquis +amiodarone MM, Prostate CA, HTN, and recent admission in [place] for an ulcer presenting for hypotension, He is being admitted for management of LGIB w/ pna.    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 Recommend rectal mesalamine QD. If drops Hgb may offer APC (flex sig).   -continue AC. no rec for flex sigmoidoscopy for now as per gastro.    #PNA-less likely  -small right basilar pleural effusion (parapneumonic?) w/ consolidative opacities noted on CT AP  -s/p cefepime/levaquin in ED. received cefepime  - vitally stable, no sirs  - f/u sputum cx/gram stain, procal, urine strep/legionella, MRSA swab, procal  -RVP -ve  - tylenol prn    #EDUARDO, probable pre-renal etiology   -creatinine on admission=2> 1.8, stable  - received IV hydration    #afib  - on Eliquis 5mg bid + amiodarone 400mg qd    #MM  #Prostate Cancer  - pt follows w/ Dr. Marie  - CT AP: L5 vertebral body sclerotic lesion concerning for metastatic disease.  - o/p f/u    #Dementia  #Hx of CVA  - AAOx1 at baseline  - per NH reports, patient takes:  mirtazapine 15mg half a tab qhs  depakote 125mg 2 capsules q8  keppra 750mg qd    #BPH  - flomax 0.4mg qhs    # Misc  - GI ppx: protonix BID  - Diet: NPO  - Activity: bedrest   Patient is an 89 year old male with PMH of dementia with AMS, afib on eliquis +amiodarone MM, Prostate CA, HTN, and recent admission in [place] for an ulcer presenting for hypotension, He is being admitted for management of LGIB w/ pna.    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 Recommend rectal mesalamine QD. If drops Hgb may offer APC (flex sig).   -continue AC. no rec for flex sigmoidoscopy for now as per gastro.    #EDUARDO, probable pre-renal etiology   -creatinine on admission=2> 1.8> 1.7 (7/18), trending down  -contacted PCP Dr. Collins [phone], creatinine baseline levels 1.04-1.13 in 2023-beg of 2024, with a value in July 10 of 1.37.  - received IV hydration    #afib  - on Eliquis 5mg bid + amiodarone 400mg qd    #MM  #Prostate Cancer  - pt follows w/ Dr. Marie  - CT AP: L5 vertebral body sclerotic lesion concerning for metastatic disease.  - o/p f/u    #Dementia  #Hx of CVA  - AAOx1 at baseline  - per NH reports, patient takes:  mirtazapine 15mg half a tab qhs  depakote 125mg 2 capsules q8  keppra 750mg qd      #PNA-less likely  -small right basilar pleural effusion (parapneumonic?) w/ consolidative opacities noted on CT AP  -s/p cefepime/levaquin in ED. received cefepime  - vitally stable, no sirs  - f/u sputum cx/gram stain, procal, urine strep/legionella, MRSA swab, procal  -RVP -ve  - tylenol prn    #BPH  - flomax 0.4mg qhs    # Misc  - GI ppx: protonix BID  - Diet: NPO  - Activity: bedrest   Patient is an 89 year old male with PMH of dementia with AMS, afib on eliquis +amiodarone MM, Prostate CA, HTN, and recent admission in [place] for an ulcer presenting for hypotension, He is being admitted for management of LGIB w/ pna.    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 Recommend rectal mesalamine QD. If drops Hgb may offer APC (flex sig).   -continue AC. no rec for flex sigmoidoscopy for now as per gastro.    #EDUARDO, probable pre-renal etiology   -creatinine on admission=2> 1.8> 1.7 (7/18), trending down  -contacted PCP Dr. Collins [phone], creatinine baseline levels 1.04-1.13 in 2023-beg of 2024, with a value in July 10 of 1.37.  - received IV hydration    #afib  - on Eliquis 5mg bid + amiodarone 400mg qd    #MM  #Prostate Cancer  - pt follows w/ Dr. Marie  - CT AP: L5 vertebral body sclerotic lesion concerning for metastatic disease.  - o/p f/u    #Dementia  #Hx of CVA  - AAOx1 at baseline  - per NH reports, patient takes:  mirtazapine 15mg half a tab qhs  depakote 125mg 2 capsules q8  keppra 750mg qd      #PNA-less likely  -small right basilar pleural effusion (parapneumonic?) w/ consolidative opacities noted on CT AP  -s/p cefepime/levaquin in ED. received cefepime  - vitally stable, no sirs  - f/u sputum cx/gram stain, procal, urine strep/legionella, MRSA swab, procal  -RVP -ve  - tylenol prn    #BPH  - flomax 0.4mg qhs    # Misc  - GI ppx: protonix BID  - Diet: NPO  - Activity: bedrest    Plan: probable discharge tomorrow, check cbc & creatinine

## 2024-07-18 NOTE — DISCHARGE NOTE PROVIDER - ATTENDING DISCHARGE PHYSICAL EXAMINATION:
General: NAD, A/O x 1  ENT: MMM  Neck: Supple, No JVD  Lungs: Clear to auscultation bilaterally  Cardio: RRR, S1/S2, 2/6 systolic murmur   Abdomen: Soft, Nontender, Nondistended; Bowel sounds present  Extremities: No cyanosis, No edema

## 2024-07-18 NOTE — DISCHARGE NOTE PROVIDER - CARE PROVIDER_API CALL
Matthias Thorne  Geriatric Medicine  [address] [place] [address]  Phone: [phone]  Fax: [phone]  Follow Up Time: 2 weeks    Stephanie Aranda  Gastroenterology  [address] [place] [id number]  Phone: [phone]  Fax: [phone]  Follow Up Time: 1 week

## 2024-07-18 NOTE — DIETITIAN INITIAL EVALUATION ADULT - NAME AND PHONE
Shannon [phone] or TEAMS    Nutrition Intervention: meals, snacks, supplements; Nutrition Monitoring: Diet order, PO intake, weights, labs, NFPF, body composition, BM and tolerance to medical food supplements

## 2024-07-18 NOTE — DISCHARGE NOTE PROVIDER - NSDCCPCAREPLAN_GEN_ALL_CORE_FT
PRINCIPAL DISCHARGE DIAGNOSIS  Diagnosis: Rectal bleeding  Assessment and Plan of Treatment: Mr Sun presented to hospital for change in mental status, with findings of bleed on rectal exam. He was admitted for lower Gastro-intestinal bleed & he was started. Imaging was done and waspositive for inflammation in the rectum. 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 He recieved mesalamine and hemoglobin levels were monitored. He will be discharged after blood hemoglobin level found to be stable.  In case of rectal bleeding or other new symptoms, please seek care.      SECONDARY DISCHARGE DIAGNOSES  Diagnosis: EDUARDO (acute kidney injury)  Assessment and Plan of Treatment: Mr Sun was found to have an acute kidney injury. His PCP was contacted for baseline creatinine levels. His creatinine levels were decreasing (from 2 to 1.7 on 7/18).    Diagnosis: Opacity of lung on imaging study  Assessment and Plan of Treatment: Mr Sun recieved antibiotics in ED after opacity wqs found in the lung, suspicious of pneumonia. No other signs of infection were found so antibiotics were stopped.    Diagnosis: Hilar mass  Assessment and Plan of Treatment:      PRINCIPAL DISCHARGE DIAGNOSIS  Diagnosis: Rectal bleeding  Assessment and Plan of Treatment: Mr Sun presented to hospital for change in mental status, with findings of bleed on rectal exam. He was admitted for lower Gastro-intestinal bleed. Imaging was done and was positive for inflammation in the rectum. 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 He recieved mesalamine and hemoglobin levels were monitored and stable. He will be discharged to NH, hemoglobin stable at this time.   In case of rectal bleeding or other new symptoms, please seek care.      SECONDARY DISCHARGE DIAGNOSES  Diagnosis: EDUARDO (acute kidney injury)  Assessment and Plan of Treatment: Mr Sun was found to have an acute kidney injury. Improving at time of discharge, needs follow up with PCP as OP.    Diagnosis: Opacity of lung on imaging study  Assessment and Plan of Treatment: Mr Sun recieved antibiotics in ED after opacity was found in the lung, suspicious of pneumonia. No other signs of infection were found so antibiotics were stopped.

## 2024-07-18 NOTE — DISCHARGE NOTE PROVIDER - PROVIDER TOKENS
PROVIDER:[TOKEN:[[id number]],FOLLOWUP:[2 weeks]],PROVIDER:[TOKEN:[[id number]],FOLLOWUP:[1 week]]

## 2024-07-18 NOTE — PROGRESS NOTE ADULT - SUBJECTIVE AND OBJECTIVE BOX
Patient is a 89y old  Male who presents with a chief complaint of Hemorrhage of rectum and anus     (18 Jul 2024 10:48)      Patient seen and examined at bedside.    ALLERGIES:  No Known Allergies    MEDICATIONS:  aMIOdarone    Tablet 400 milliGRAM(s) Oral daily  apixaban 2.5 milliGRAM(s) Oral every 12 hours  calcitriol   Capsule 0.25 MICROGram(s) Oral daily  folic acid 1 milliGRAM(s) Oral daily  levETIRAcetam 750 milliGRAM(s) Oral daily  mesalamine Suppository 1000 milliGRAM(s) Rectal at bedtime  mirtazapine 7.5 milliGRAM(s) Oral at bedtime  pantoprazole    Tablet 40 milliGRAM(s) Oral two times a day  tamsulosin 0.4 milliGRAM(s) Oral at bedtime  valproic  acid Syrup 250 milliGRAM(s) Oral three times a day    Vital Signs Last 24 Hrs  T(F): 97.5 (18 Jul 2024 14:13), Max: 97.7 (18 Jul 2024 05:16)  HR: 84 (18 Jul 2024 14:13) (78 - 84)  BP: 93/58 (18 Jul 2024 14:13) (93/58 - 146/64)  RR: 18 (18 Jul 2024 14:13) (18 - 18)  SpO2: 96% (18 Jul 2024 14:13) (96% - 98%)  I&O's Summary      PHYSICAL EXAM:  General: NAD, A/O x 1-2  ENT: MMM  Neck: Supple, No JVD  Lungs: Clear to auscultation bilaterally  Cardio: RRR, S1/S2, 2/6 systolic murmur   Abdomen: Soft, Nontender, Nondistended; Bowel sounds present  Extremities: No cyanosis, No edema    LABS:                        8.9    4.46  )-----------( 188      ( 18 Jul 2024 06:59 )             27.1     07-18    144  |  110  |  20  ----------------------------<  96  4.4   |  23  |  1.7    Ca    8.6      18 Jul 2024 06:59  Mg     1.9     07-18    TPro  6.4  /  Alb  3.2  /  TBili  0.5  /  DBili  x   /  AST  14  /  ALT  9   /  AlkPhos  70  07-16      PT/INR - ( 15 Jul 2024 19:00 )   PT: 24.20 sec;   INR: 2.11 ratio         PTT - ( 15 Jul 2024 19:00 )  PTT:41.0 sec  Lactate, Blood: 1.7 mmol/L (07-15 @ 17:10)            17:32 - VBG - pH: 7.37  | pCO2: 43    | pO2: 12    | Lactate: 1.8                  Urinalysis Basic - ( 18 Jul 2024 06:59 )    Color: x / Appearance: x / SG: x / pH: x  Gluc: 96 mg/dL / Ketone: x  / Bili: x / Urobili: x   Blood: x / Protein: x / Nitrite: x   Leuk Esterase: x / RBC: x / WBC x   Sq Epi: x / Non Sq Epi: x / Bacteria: x            RADIOLOGY & ADDITIONAL TESTS:    Care Discussed with Consultants/Other Providers: aurora

## 2024-07-18 NOTE — DIETITIAN INITIAL EVALUATION ADULT - ORAL INTAKE PTA/DIET HISTORY
Pt unable to participate in nutrition assessment interview at time of visit; confused per flow sheet. Unable to obtain nutrition hx from aide at bedside. No family at bedside. Hx limited to medical chart.  normal...

## 2024-07-18 NOTE — PHYSICAL THERAPY INITIAL EVALUATION ADULT - ADDITIONAL COMMENTS
Pt is a poor historian, confused, Prior functional level is not known.  He was admitted from [facility]

## 2024-07-18 NOTE — DIETITIAN INITIAL EVALUATION ADULT - PERTINENT LABORATORY DATA
07-18    144  |  110  |  20  ----------------------------<  96  4.4   |  23  |  1.7<H>    Ca    8.6      18 Jul 2024 06:59  Mg     1.9     07-18

## 2024-07-18 NOTE — PROGRESS NOTE ADULT - SUBJECTIVE AND OBJECTIVE BOX
SUBJECTIVE/OVERNIGHT EVENTS  Today is hospital day 3d. This morning patient was seen and examined at bedside, resting comfortably in bed. No acute or major events overnight. Better mental status today, asking questions about his health state.    MEDICATIONS  STANDING MEDICATIONS  aMIOdarone    Tablet 400 milliGRAM(s) Oral daily  apixaban 2.5 milliGRAM(s) Oral every 12 hours  calcitriol   Capsule 0.25 MICROGram(s) Oral daily  folic acid 1 milliGRAM(s) Oral daily  levETIRAcetam 750 milliGRAM(s) Oral daily  mesalamine Suppository 1000 milliGRAM(s) Rectal at bedtime  mirtazapine 7.5 milliGRAM(s) Oral at bedtime  pantoprazole  Injectable 40 milliGRAM(s) IV Push two times a day  tamsulosin 0.4 milliGRAM(s) Oral at bedtime  valproic  acid Syrup 250 milliGRAM(s) Oral three times a day    PRN MEDICATIONS    VITALS  T(F): 97.7 (07-18-24 @ 05:16), Max: 97.7 (07-18-24 @ 05:16)  HR: 78 (07-18-24 @ 05:16) (75 - 78)  BP: 110/52 (07-18-24 @ 05:16) (110/52 - 150/68)  RR: 18 (07-18-24 @ 05:16) (18 - 18)  SpO2: 97% (07-18-24 @ 05:16) (97% - 98%)    PHYSICAL EXAM  GENERAL  (x  ) NAD, lying in bed comfortably     (  ) obtunded     (  ) lethargic     (  ) somnolent    HEAD  (x  ) Atraumatic     (  ) hematoma     (  ) laceration (specify location:       )     NECK  ( x ) Supple     (  ) neck stiffness     (  ) nuchal rigidity     (  )  no JVD     (  ) JVD present ( -- cm)    HEART  Rate -->  ( x ) normal rate    (  ) bradycardic    (  ) tachycardic  Rhythm -->  (x  ) regular    (  ) regularly irregular    (  ) irregularly irregular  Murmurs -->  ( x ) normal s1/s2    (  ) systolic murmur    (  ) diastolic murmur    (  ) continuous murmur     (  ) S3 present    (  ) S4 present    LUNGS  ( x )Unlabored respirations     (  ) tachypnea  ( x ) B/L air entry     (  ) decreased breath sounds in:  (location     )    ( x ) no adventitious sound     (  ) crackles     (  ) wheezing      (  ) rhonchi      (specify location:       )  (  ) chest wall tenderness (specify location:       )    ABDOMEN  (  x) Soft     (  ) tense   |   (  ) nondistended     (  ) distended   |   (  ) +BS     (  ) hypoactive bowel sounds     (  ) hyperactive bowel sounds  (x  ) nontender     (  ) RUQ tenderness     (  ) RLQ tenderness     (  ) LLQ tenderness     (  ) epigastric tenderness     (  ) diffuse tenderness  (  ) Splenomegaly      (  ) Hepatomegaly      (  ) Jaundice     (  ) ecchymosis     EXTREMITIES  (x  ) Normal     (  ) Rash     (  ) ecchymosis     (  ) varicose veins      (  ) pitting edema     (  ) non-pitting edema   (  ) ulceration     (  ) gangrene:     (location:     )    NERVOUS SYSTEM  Alert, oriented to person & place    SKIN  ( x ) No rashes or lesions     (  ) maculopapular rash     (  ) pustules     (  ) vesicles     (  ) ulcer     (  ) ecchymosis     (specify location:     )    LABS             8.9    4.46  )-----------( 188      ( 07-18-24 @ 06:59 )             27.1     146  |  110  |  26  -------------------------<  91   07-17-24 @ 08:07  4.3  |  20  |  1.8    Ca      8.6     07-17-24 @ 08:07  Mg     2.0     07-17-24 @ 08:07          Urinalysis Basic - ( 17 Jul 2024 08:07 )    Color: x / Appearance: x / SG: x / pH: x  Gluc: 91 mg/dL / Ketone: x  / Bili: x / Urobili: x   Blood: x / Protein: x / Nitrite: x   Leuk Esterase: x / RBC: x / WBC x   Sq Epi: x / Non Sq Epi: x / Bacteria: x          IMAGING

## 2024-07-18 NOTE — DISCHARGE NOTE PROVIDER - HOSPITAL COURSE
Patient is an 89 year old male with PMH of AMS, afib on eliquis +amiodarone MM, Prostate CA, HTN, and recent admission in [place] for an ulcer presenting for hypotension, Patient was BIBEMS from [facility] for reports of hypotension and lethargy/AMS. Patient denies reports of chest pain, shortness of breath, nausea, vomiting, abdominal pain, diarrhea, constipation, or additional complaints.   In ED, vitals were stable. Found to have BRBPR on ROMULO. CT A/P w/ fndings concerning for pna, CTH w/ acute pathology.   GI was consulted. Recommended NPO w. ppi BID. Patient received IV cefepime/levaquin, LR bolus x1 and protonix IV 40mg x1 in ED. He was admitted for LGIB and pna.    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 Recommend rectal mesalamine QD. If drops Hgb may offer APC (flex sig).   - Resumed feeds & continue AC, no rec for flex sigmoidoscopy for now as per gastro.    #EDUARDO, probable pre-renal etiology, resolving  -creatinine on admission=2> 1.8> 1.7 (7/18), trending down  -contacted PCP Dr. Collins [phone], creatinine baseline levels 1.04-1.13 in 2023-beg of 2024, with a value in July 10 of 1.37.  - received IV hydration    #afib  - on Eliquis 5mg bid + amiodarone 400mg qd    #MM  #Prostate Cancer  - pt follows w/ Dr. Marie  - CT AP: L5 vertebral body sclerotic lesion concerning for metastatic disease.  - o/p f/u    #Dementia  #Hx of CVA  - AAOx1 at baseline  -on 7/18: seems oriented to place & person  - per NH reports, patient takes:  mirtazapine 15mg half a tab qhs  depakote 125mg 2 capsules q8  keppra 750mg qd      #PNA-ruled out   -small right basilar pleural effusion (parapneumonic?) w/ consolidative opacities noted on CT AP  -s/p cefepime/levaquin in ED. received cefepime  - vitally stable, no sirs  - f/u sputum cx/gram stain, procal, urine strep/legionella, MRSA swab, procal  -RVP -ve  - tylenol prn      #BPH  - flomax 0.4mg qhs    # Misc  - GI ppx: protonix BID  - Diet: NPO  - Activity: bedrest    Patient is to be discharged after insurance authorization, Hb stable, creatinine decreasing Patient is an 89 year old male with PMH of AMS, afib on eliquis +amiodarone MM, Prostate CA, HTN, and recent admission in [place] for an ulcer presenting for hypotension, Patient was BIBEMS from [facility] for reports of hypotension and lethargy/AMS. Patient denies reports of chest pain, shortness of breath, nausea, vomiting, abdominal pain, diarrhea, constipation, or additional complaints.   In ED, vitals were stable. Found to have BRBPR on ROMULO. CT A/P w/ fndings concerning for pna, CTH w/ acute pathology.   GI was consulted. Recommended NPO w. ppi BID. Patient received IV cefepime/levaquin, LR bolus x1 and protonix IV 40mg x1 in ED. He was admitted for LGIB and pna.    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 Recommend rectal mesalamine QD.   - Resumed feeds & continue AC, no rec for flex sigmoidoscopy for now as per gastro.    #EDUARDO, probable pre-renal etiology, resolving  -creatinine on admission=2> 1.8> 1.7 (7/18), trending down  -contacted PCP Dr. Collins [phone], creatinine baseline levels 1.04-1.13 in 2023-beg of 2024, with a value in July 10 of 1.37.  - received IV hydration  - needs OP follow with PCP     #afib  - on Eliquis 5mg bid + amiodarone 400mg qd    #MM  #Prostate Cancer  - pt follows w/ Dr. Marie  - CT AP: L5 vertebral body sclerotic lesion concerning for metastatic disease.  - o/p f/u    #Dementia  #Hx of CVA  - AAOx1 at baseline, AAOx1 on 7/19   - per NH reports, patient takes:  mirtazapine 15mg half a tab qhs  depakote 125mg 2 capsules q8  keppra 750mg qd      #PNA-ruled out   -small right basilar pleural effusion (parapneumonic?) w/ consolidative opacities noted on CT AP  -s/p cefepime/levaquin in ED. received cefepime  - vitally stable, no sirs, procal 0.3 low suspicion for infection   - off abx     #BPH  - flomax 0.4mg qhs

## 2024-07-18 NOTE — EEG REPORT - NS EEG TEXT BOX
[facility] Neu[facility]  Inpatient Routine-EEG Report      Patient Name:	MACK HERBERT    :	1935  MRN:	[id number]  Study Date/Time:	2024, 11:37:40 AM  Referred by:	-    Brief Clinical History:  MACK HERBERT is a 89 year old Male; study performed to investigate for seizures or markers of epilepsy.   Diagnosis Code: R40.4 Transient alteration of awareness    Patient Medication:  Flomax    Protonix    Remeron    Canasa    Keppra    Depacon    Folic acid    Rocaltrol    Eliquis    Pacerone      Acquisition Details:  Electroencephalography was acquired using a minimum of 21 channels on an [employer] system v 9.3.1 with electrode placement according to the standard International 10-20 system following ACNS (American Clinical Neurophysiology Society) guidelines.  Anterior temporal T1 and T2 electrodes were utilized whenever possible.   The [employer] automated spike & seizure detections were all reviewed in detail, in addition to the entire raw EEG.    Findings:  Background:  continuous.   Voltage:  Normal (20uV)  Organization:  Rudimentary  Posterior Dominant Rhythm:  <7 Hz symmetric, well-organized, and well-modulated  Variability:  Yes	Reactivity:  Yes  Sleep:  Absent.  Focal abnormalities:  No persistent asymmetries of voltage or frequency.  Interictal Activity:  None  Focal Slowing:  None  Generalized Slowing:  Mild  Events:  1)	No electrographic seizures or significant clinical events.  Provocations:  1)	Hyperventilation: was not performed.  2)	Photic stimulation: was not performed.  Impression:  Abnormal due to generalized slowing as above    Clinical Correlation:  Findings consistent with diffuse electrocerebral dysfunction secondary to nonspecific etiology    Juan Pablo Sparks MD  Attending Neurologist, Division of Epilepsy

## 2024-07-18 NOTE — PROGRESS NOTE ADULT - ASSESSMENT
Patient is an 89 year old male with PMH of AMS, afib on eliquis +amiodarone MM, Prostate CA, HTN, and recent admission in [place] for an ulcer presenting for hypotension, He is being admitted for management of LGIB w/ pna.    #LGIB  #Proctitis  hematochezia, BRBPR noted on ROMULO  no hemmorhoids/fissures noted on exam  -CT showed proctitis (may be cause for LGIB)  - Hb -stable at 8.9  - t&s keep acitve   - cbc once a day   - received IV hydration LR  - Pt diet & meds resumed  -on PPI po bid  - GI consult appreciated     #PNA-ruled out   -small right basilar pleural effusion (parapneumonic?) w/ consolidative opacities noted on CT AP  -s/p cefepime/levaquin in ED. received cefepime  - vitally stable, no sirs  - f/u sputum cx/gram stain, procal, urine strep/legionella, MRSA swab, procal  -RVP -ve  - tylenol prn    #EDUARDO-irmpoving   -creatinine on admission=2> 1.8->1.7  - received IV hydration  - baseline creatinine  from pt's PMD Dr. Collins [phone]     #afib  - on Eliquis 5mg bid + amiodarone 400mg qd    #MM  #Prostate Cancer  - pt follows w/ Dr. Marie  - CT AP: L5 vertebral body sclerotic lesion concerning for metastatic disease.  - o/p f/u    #Dementia  #Hx of CVA  - AAOx1 at baseline  - per NH reports, patient takes:  mirtazapine 15mg half a tab qhs  depakote 125mg 2 capsules q8  keppra 750mg qd  - hold pending GI recs  - restart when able    #BPH  - flomax 0.4mg qhs    #GOC  -discussed with daughter dnr/dni 7/17- she will discuss with her 2 sisters  - 7/18 Carmen stated she wanted to discuss dnr with her father   -full code for now     # Misc  - GI ppx: protonix BID  - Diet: dash  - Activity: as tolerated  - Pending: h/h qd, creatinine, continue goc conversation with daughter  - Dispo: [facility]

## 2024-07-19 VITALS
TEMPERATURE: 99 F | SYSTOLIC BLOOD PRESSURE: 107 MMHG | HEART RATE: 73 BPM | RESPIRATION RATE: 18 BRPM | DIASTOLIC BLOOD PRESSURE: 66 MMHG

## 2024-07-19 LAB
ANION GAP SERPL CALC-SCNC: 13 MMOL/L — SIGNIFICANT CHANGE UP (ref 7–14)
BASOPHILS # BLD AUTO: 0.02 K/UL — SIGNIFICANT CHANGE UP (ref 0–0.2)
BASOPHILS NFR BLD AUTO: 0.4 % — SIGNIFICANT CHANGE UP (ref 0–1)
BUN SERPL-MCNC: 18 MG/DL — SIGNIFICANT CHANGE UP (ref 10–20)
CALCIUM SERPL-MCNC: 8.5 MG/DL — SIGNIFICANT CHANGE UP (ref 8.4–10.5)
CHLORIDE SERPL-SCNC: 111 MMOL/L — HIGH (ref 98–110)
CO2 SERPL-SCNC: 22 MMOL/L — SIGNIFICANT CHANGE UP (ref 17–32)
CREAT SERPL-MCNC: 1.7 MG/DL — HIGH (ref 0.7–1.5)
EGFR: 38 ML/MIN/1.73M2 — LOW
EOSINOPHIL # BLD AUTO: 0.11 K/UL — SIGNIFICANT CHANGE UP (ref 0–0.7)
EOSINOPHIL NFR BLD AUTO: 2.3 % — SIGNIFICANT CHANGE UP (ref 0–8)
GLUCOSE SERPL-MCNC: 82 MG/DL — SIGNIFICANT CHANGE UP (ref 70–99)
HCT VFR BLD CALC: 26.7 % — LOW (ref 42–52)
HGB BLD-MCNC: 8.8 G/DL — LOW (ref 14–18)
IMM GRANULOCYTES NFR BLD AUTO: 0.6 % — HIGH (ref 0.1–0.3)
LYMPHOCYTES # BLD AUTO: 2.07 K/UL — SIGNIFICANT CHANGE UP (ref 1.2–3.4)
LYMPHOCYTES # BLD AUTO: 43.8 % — SIGNIFICANT CHANGE UP (ref 20.5–51.1)
MAGNESIUM SERPL-MCNC: 2 MG/DL — SIGNIFICANT CHANGE UP (ref 1.8–2.4)
MCHC RBC-ENTMCNC: 28 PG — SIGNIFICANT CHANGE UP (ref 27–31)
MCHC RBC-ENTMCNC: 33 G/DL — SIGNIFICANT CHANGE UP (ref 32–37)
MCV RBC AUTO: 85 FL — SIGNIFICANT CHANGE UP (ref 80–94)
MONOCYTES # BLD AUTO: 0.48 K/UL — SIGNIFICANT CHANGE UP (ref 0.1–0.6)
MONOCYTES NFR BLD AUTO: 10.1 % — HIGH (ref 1.7–9.3)
NEUTROPHILS # BLD AUTO: 2.02 K/UL — SIGNIFICANT CHANGE UP (ref 1.4–6.5)
NEUTROPHILS NFR BLD AUTO: 42.8 % — SIGNIFICANT CHANGE UP (ref 42.2–75.2)
NRBC # BLD: 0 /100 WBCS — SIGNIFICANT CHANGE UP (ref 0–0)
PLATELET # BLD AUTO: 161 K/UL — SIGNIFICANT CHANGE UP (ref 130–400)
PMV BLD: 11 FL — HIGH (ref 7.4–10.4)
POTASSIUM SERPL-MCNC: 4.4 MMOL/L — SIGNIFICANT CHANGE UP (ref 3.5–5)
POTASSIUM SERPL-SCNC: 4.4 MMOL/L — SIGNIFICANT CHANGE UP (ref 3.5–5)
RBC # BLD: 3.14 M/UL — LOW (ref 4.7–6.1)
RBC # FLD: 17.3 % — HIGH (ref 11.5–14.5)
SODIUM SERPL-SCNC: 146 MMOL/L — SIGNIFICANT CHANGE UP (ref 135–146)
WBC # BLD: 4.73 K/UL — LOW (ref 4.8–10.8)
WBC # FLD AUTO: 4.73 K/UL — LOW (ref 4.8–10.8)

## 2024-07-19 PROCEDURE — 99239 HOSP IP/OBS DSCHRG MGMT >30: CPT

## 2024-07-19 RX ORDER — MESALAMINE 500 MG/1
1 CAPSULE ORAL
Qty: 0 | Refills: 0 | DISCHARGE
Start: 2024-07-19

## 2024-07-19 RX ORDER — PANTOPRAZOLE SODIUM 40 MG/10ML
1 INJECTION, POWDER, FOR SOLUTION INTRAVENOUS
Qty: 30 | Refills: 0
Start: 2024-07-19 | End: 2024-08-17

## 2024-07-19 RX ADMIN — CALCITRIOL 0.25 MICROGRAM(S): 0.25 CAPSULE, LIQUID FILLED ORAL at 14:13

## 2024-07-19 RX ADMIN — PANTOPRAZOLE SODIUM 40 MILLIGRAM(S): 40 INJECTION, POWDER, FOR SOLUTION INTRAVENOUS at 06:16

## 2024-07-19 RX ADMIN — VALPROIC ACID 250 MILLIGRAM(S): 250 SOLUTION ORAL at 06:15

## 2024-07-19 RX ADMIN — APIXABAN 2.5 MILLIGRAM(S): 5 TABLET, FILM COATED ORAL at 06:16

## 2024-07-19 RX ADMIN — APIXABAN 2.5 MILLIGRAM(S): 5 TABLET, FILM COATED ORAL at 18:00

## 2024-07-19 RX ADMIN — Medication 1 MILLIGRAM(S): at 14:13

## 2024-07-19 RX ADMIN — AMIODARONE HYDROCHLORIDE 400 MILLIGRAM(S): 50 INJECTION, SOLUTION INTRAVENOUS at 06:16

## 2024-07-19 RX ADMIN — VALPROIC ACID 250 MILLIGRAM(S): 250 SOLUTION ORAL at 14:13

## 2024-07-19 RX ADMIN — LEVETIRACETAM 750 MILLIGRAM(S): 100 INJECTION INTRAVENOUS at 14:12

## 2024-07-19 RX ADMIN — PANTOPRAZOLE SODIUM 40 MILLIGRAM(S): 40 INJECTION, POWDER, FOR SOLUTION INTRAVENOUS at 18:00

## 2024-07-19 NOTE — DISCHARGE NOTE NURSING/CASE MANAGEMENT/SOCIAL WORK - PATIENT PORTAL LINK FT
You can access the FollowMyHealth Patient Portal offered by [facility] by registering at the following website: [email or web address]. By joining [facility]’s FollowMyHealth portal, you will also be able to view your health information using other applications (apps) compatible with our system.

## 2024-07-19 NOTE — DISCHARGE NOTE NURSING/CASE MANAGEMENT/SOCIAL WORK - NSDCPEFALRISK_GEN_ALL_CORE
For information on Fall & Injury Prevention, visit: [email or web address] OR  [email or web address] OR  https://www.cdc.gov/steadi/patient.html

## 2024-07-27 DIAGNOSIS — C90.00 MULTIPLE MYELOMA NOT HAVING ACHIEVED REMISSION: ICD-10-CM

## 2024-07-27 DIAGNOSIS — K62.7 RADIATION PROCTITIS: ICD-10-CM

## 2024-07-27 DIAGNOSIS — Z90.3 ACQUIRED ABSENCE OF STOMACH [PART OF]: ICD-10-CM

## 2024-07-27 DIAGNOSIS — I95.9 HYPOTENSION, UNSPECIFIED: ICD-10-CM
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
## 2024-07-27 DIAGNOSIS — R91.8 OTHER NONSPECIFIC ABNORMAL FINDING OF LUNG FIELD: ICD-10-CM

## 2024-07-27 DIAGNOSIS — Z79.01 LONG TERM (CURRENT) USE OF ANTICOAGULANTS: ICD-10-CM

## 2024-07-27 DIAGNOSIS — M89.9 DISORDER OF BONE, UNSPECIFIED: ICD-10-CM

## 2024-07-27 DIAGNOSIS — Z86.73 PERSONAL HISTORY OF TRANSIENT ISCHEMIC ATTACK (TIA), AND CEREBRAL INFARCTION WITHOUT RESIDUAL DEFICITS: ICD-10-CM

## 2024-07-27 DIAGNOSIS — Z85.028 PERSONAL HISTORY OF OTHER MALIGNANT NEOPLASM OF STOMACH: ICD-10-CM

## 2024-07-27 DIAGNOSIS — F03.90 UNSPECIFIED DEMENTIA, UNSPECIFIED SEVERITY, WITHOUT BEHAVIORAL DISTURBANCE, PSYCHOTIC DISTURBANCE, MOOD DISTURBANCE, AND ANXIETY: ICD-10-CM

## 2024-07-27 DIAGNOSIS — I48.91 UNSPECIFIED ATRIAL FIBRILLATION: ICD-10-CM

## 2024-07-27 DIAGNOSIS — K92.1 MELENA: ICD-10-CM
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
## 2024-07-27 DIAGNOSIS — I10 ESSENTIAL (PRIMARY) HYPERTENSION: ICD-10-CM

## 2024-07-27 DIAGNOSIS — N40.0 BENIGN PROSTATIC HYPERPLASIA WITHOUT LOWER URINARY TRACT SYMPTOMS: ICD-10-CM

## 2024-07-27 DIAGNOSIS — J90 PLEURAL EFFUSION, NOT ELSEWHERE CLASSIFIED: ICD-10-CM

## 2024-07-27 DIAGNOSIS — C61 MALIGNANT NEOPLASM OF PROSTATE: ICD-10-CM

## 2024-07-27 DIAGNOSIS — N17.9 ACUTE KIDNEY FAILURE, UNSPECIFIED: ICD-10-CM

## 2025-04-16 NOTE — PROVIDER CONTACT NOTE (FALL NOTIFICATION) - NAME OF MD/NP/PA/DO NOTIFIED:
Return to this emergency room immediately if your symptoms persist, worsen or if new ones form.    Make sure you follow-up with Dr. Arrieta within the next 1-2 business days.   Dr. Gerard Tolentino